# Patient Record
Sex: FEMALE | Race: WHITE | NOT HISPANIC OR LATINO | Employment: UNEMPLOYED | URBAN - METROPOLITAN AREA
[De-identification: names, ages, dates, MRNs, and addresses within clinical notes are randomized per-mention and may not be internally consistent; named-entity substitution may affect disease eponyms.]

---

## 2018-06-22 LAB
ABORH_EXT (HISTORICAL CONVERSION): NORMAL
ANTIBODY_EXT (HISTORICAL CONVERSION): NEGATIVE
HBSAG_EXT (HISTORICAL CONVERSION): NORMAL
HGB_EXT (HISTORICAL CONVERSION): 13.4
HIV_EXT: NEGATIVE
PLT_EXT - HISTORICAL: 191
RPR - HISTORICAL: NORMAL
RUBELLA_EXT (HISTORICAL CONVERSION): NORMAL

## 2019-01-15 ENCOUNTER — HOSPITAL ENCOUNTER (OUTPATIENT)
Dept: OBGYN | Facility: HOSPITAL | Age: 29
Discharge: HOME | End: 2019-01-15
Attending: OBSTETRICS & GYNECOLOGY | Admitting: OBSTETRICS & GYNECOLOGY

## 2019-01-15 DIAGNOSIS — R11.0 NAUSEA: ICD-10-CM

## 2019-01-15 LAB
ALBUMIN UR-MCNC: ABNORMAL MG/DL
APPEARANCE UR: ABNORMAL
BACTERIA #/AREA URNS HPF: ABNORMAL HPF
BILIRUB UR QL STRIP: NEGATIVE
COLOR UR AUTO: YELLOW
GLUCOSE UR STRIP-MCNC: NEGATIVE MG/DL
HGB UR QL STRIP: NEGATIVE
KETONES UR STRIP-MCNC: ABNORMAL MG/DL
LEUKOCYTE ESTERASE UR QL STRIP: ABNORMAL
MUCOUS THREADS #/AREA URNS LPF: ABNORMAL LPF
NITRATE UR QL: NEGATIVE
PH UR STRIP: 6 [PH] (ref 4.5–8)
RBC #/AREA URNS AUTO: ABNORMAL HPF
SP GR UR STRIP: 1.02 (ref 1–1.03)
SQUAMOUS #/AREA URNS AUTO: ABNORMAL LPF
TRANS CELLS #/AREA URNS HPF: ABNORMAL LPF
UROBILINOGEN UR STRIP-ACNC: ABNORMAL
WBC #/AREA URNS AUTO: ABNORMAL HPF

## 2019-01-15 RX ORDER — FLUOXETINE 10 MG/1
CAPSULE ORAL
Refills: 3 | Status: ON HOLD | COMMUNITY
Start: 2019-01-03 | End: 2024-09-03

## 2019-01-15 ASSESSMENT — MIFFLIN-ST. JEOR: SCORE: 1467.51

## 2019-01-16 LAB — BACTERIA SPEC CULT: NO GROWTH

## 2019-01-17 LAB
ALLERGIC TO PENICILLIN: NO
GP B STREP DNA SPEC QL NAA+PROBE: NEGATIVE

## 2019-02-01 ENCOUNTER — ANESTHESIA - HEALTHEAST (OUTPATIENT)
Dept: OBGYN | Facility: HOSPITAL | Age: 29
End: 2019-02-01

## 2019-02-04 ENCOUNTER — COMMUNICATION - HEALTHEAST (OUTPATIENT)
Dept: OBGYN | Facility: HOSPITAL | Age: 29
End: 2019-02-04

## 2021-05-25 ENCOUNTER — RECORDS - HEALTHEAST (OUTPATIENT)
Dept: ADMINISTRATIVE | Facility: CLINIC | Age: 31
End: 2021-05-25

## 2021-05-28 ENCOUNTER — RECORDS - HEALTHEAST (OUTPATIENT)
Dept: ADMINISTRATIVE | Facility: CLINIC | Age: 31
End: 2021-05-28

## 2021-05-30 ENCOUNTER — RECORDS - HEALTHEAST (OUTPATIENT)
Dept: ADMINISTRATIVE | Facility: CLINIC | Age: 31
End: 2021-05-30

## 2021-05-31 ENCOUNTER — RECORDS - HEALTHEAST (OUTPATIENT)
Dept: ADMINISTRATIVE | Facility: CLINIC | Age: 31
End: 2021-05-31

## 2021-06-01 ENCOUNTER — RECORDS - HEALTHEAST (OUTPATIENT)
Dept: ADMINISTRATIVE | Facility: CLINIC | Age: 31
End: 2021-06-01

## 2021-06-02 VITALS — BODY MASS INDEX: 35.53 KG/M2 | HEIGHT: 60 IN | WEIGHT: 181 LBS

## 2021-06-16 PROBLEM — Z34.90 PREGNANT: Status: ACTIVE | Noted: 2019-02-01

## 2021-06-23 NOTE — PROGRESS NOTES
md ordered a discharge after evaluation of the patient. Nurse discussed discharge orders with the patient and she signed for discharge. No further questions or concerns.

## 2021-06-23 NOTE — ANESTHESIA POSTPROCEDURE EVALUATION
Anesthesiology Labor Epidural Rounds    Patient doing well.  Denies headache.  Denies back pain.  Patient able to move all extremities.  Vital signs stable.  Site clean, dry and intact.    Patient satisfied with labor analgesia.  No complications.      Siria Lynch M.D.  Department of Anesthesiology

## 2021-06-23 NOTE — PROGRESS NOTES
Patient here via wheelchair from ER accompanied by her mother to room 31. Patient arrives with c/o nausea, vomiting and diarrhea that started last night around 10pm. Contractions also started around that time. Patient states that her coworkers have all been sick with the stomach flu. Patient changed into gown. EFM applied. Dr De La Rosa called, report given and orders obtained.
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
## 2021-06-23 NOTE — TELEPHONE ENCOUNTER
OB Follow Up Phone Call    Patient: Tangela Steward  : 1990  MRN: [id number]     Location  [facility]  Provider Jerilyn Carrington MD      :   N/A    Language:   English    Discharge Follow-Up:  Follow-Up call by Outreach nurse: Message left for patient    Type of Delivery:      Feeding Method:  Breastfeeding and Formula    Comments:   Left message with [facility] Outreach phone number for patient to call back if desired. Reminded patient to schedule postpartum follow-up appointment as directed by PCP at discharge.  Encouraged patient to call clinic/PCP with questions or concerns.    Completed by:   Isabell Javier RN      Patient: Tangela Steward  : 1990  MRN: [id number]

## 2021-06-23 NOTE — ANESTHESIA PROCEDURE NOTES
Epidural Block    Patient location during procedure: OB  Time Called: 2/1/2019 10:40 AM  Reason for Block:labor epidural  Staffing:  Performing  Anesthesiologist: Cristiana Leyva MD  Preanesthetic Checklist  Completed: patient identified, risks, benefits, and alternatives discussed, timeout performed, consent obtained, at patient's request, airway assessed, oxygen available, suction available, emergency drugs available and hand hygiene performed  Procedure  Patient position: sitting  Prep: ChloraPrep and site prepped and draped  Patient monitoring: continuous pulse oximetry, heart rate and blood pressure  Approach: midline  Location: L3-L4  Injection technique: HERMILA saline  Number of Attempts:3 (Difficult placement)  Needle  Needle type: Soren   Needle gauge: 18 G     Catheter in Space: 5  Assessment  Sensory level:  No complications

## 2023-04-05 ENCOUNTER — HOSPITAL ENCOUNTER (EMERGENCY)
Facility: CLINIC | Age: 33
Discharge: HOME | End: 2023-04-06
Attending: EMERGENCY MEDICINE | Admitting: EMERGENCY MEDICINE
Payer: MEDICAID

## 2023-04-05 ENCOUNTER — APPOINTMENT (OUTPATIENT)
Dept: CT IMAGING | Facility: CLINIC | Age: 33
End: 2023-04-05
Attending: EMERGENCY MEDICINE
Payer: MEDICAID

## 2023-04-05 DIAGNOSIS — N10 ACUTE PYELONEPHRITIS: ICD-10-CM

## 2023-04-05 LAB
ALBUMIN SERPL-MCNC: 3.8 G/DL (ref 3.5–5)
ALBUMIN UR-MCNC: 30 MG/DL
ALP SERPL-CCNC: 116 U/L (ref 45–120)
ALT SERPL W P-5'-P-CCNC: 49 U/L (ref 0–45)
ANION GAP SERPL CALCULATED.3IONS-SCNC: 10 MMOL/L (ref 5–18)
APPEARANCE UR: ABNORMAL
AST SERPL W P-5'-P-CCNC: 26 U/L (ref 0–40)
BACTERIA #/AREA URNS HPF: ABNORMAL /HPF
BASOPHILS # BLD AUTO: 0 10E3/UL (ref 0–0.2)
BASOPHILS NFR BLD AUTO: 1 %
BILIRUB SERPL-MCNC: 0.7 MG/DL (ref 0–1)
BILIRUB UR QL STRIP: NEGATIVE
BUN SERPL-MCNC: 9 MG/DL (ref 8–22)
CALCIUM SERPL-MCNC: 9.1 MG/DL (ref 8.5–10.5)
CHLORIDE BLD-SCNC: 105 MMOL/L (ref 98–107)
CO2 SERPL-SCNC: 25 MMOL/L (ref 22–31)
COLOR UR AUTO: YELLOW
CREAT SERPL-MCNC: 0.74 MG/DL (ref 0.6–1.1)
EOSINOPHIL # BLD AUTO: 0.6 10E3/UL (ref 0–0.7)
EOSINOPHIL NFR BLD AUTO: 8 %
ERYTHROCYTE [DISTWIDTH] IN BLOOD BY AUTOMATED COUNT: 13.9 % (ref 10–15)
FLUAV RNA SPEC QL NAA+PROBE: NEGATIVE
FLUBV RNA RESP QL NAA+PROBE: NEGATIVE
GFR SERPL CREATININE-BSD FRML MDRD: >90 ML/MIN/1.73M2
GLUCOSE BLD-MCNC: 94 MG/DL (ref 70–125)
GLUCOSE UR STRIP-MCNC: NEGATIVE MG/DL
HCG UR QL: NEGATIVE
HCT VFR BLD AUTO: 42.7 % (ref 35–47)
HGB BLD-MCNC: 14 G/DL (ref 11.7–15.7)
HGB UR QL STRIP: NEGATIVE
IMM GRANULOCYTES # BLD: 0 10E3/UL
IMM GRANULOCYTES NFR BLD: 0 %
KETONES UR STRIP-MCNC: NEGATIVE MG/DL
LEUKOCYTE ESTERASE UR QL STRIP: ABNORMAL
LIPASE SERPL-CCNC: 20 U/L (ref 0–52)
LYMPHOCYTES # BLD AUTO: 1.7 10E3/UL (ref 0.8–5.3)
LYMPHOCYTES NFR BLD AUTO: 22 %
MCH RBC QN AUTO: 30 PG (ref 26.5–33)
MCHC RBC AUTO-ENTMCNC: 32.8 G/DL (ref 31.5–36.5)
MCV RBC AUTO: 91 FL (ref 78–100)
MONOCYTES # BLD AUTO: 0.5 10E3/UL (ref 0–1.3)
MONOCYTES NFR BLD AUTO: 7 %
MUCOUS THREADS #/AREA URNS LPF: PRESENT /LPF
NEUTROPHILS # BLD AUTO: 4.9 10E3/UL (ref 1.6–8.3)
NEUTROPHILS NFR BLD AUTO: 62 %
NITRATE UR QL: NEGATIVE
NRBC # BLD AUTO: 0 10E3/UL
NRBC BLD AUTO-RTO: 0 /100
PH UR STRIP: 7.5 [PH] (ref 5–7)
PLATELET # BLD AUTO: 255 10E3/UL (ref 150–450)
POTASSIUM BLD-SCNC: 3.7 MMOL/L (ref 3.5–5)
PROT SERPL-MCNC: 7.7 G/DL (ref 6–8)
RBC # BLD AUTO: 4.67 10E6/UL (ref 3.8–5.2)
RBC URINE: 2 /HPF
RSV RNA SPEC NAA+PROBE: NEGATIVE
SARS-COV-2 RNA RESP QL NAA+PROBE: NEGATIVE
SODIUM SERPL-SCNC: 140 MMOL/L (ref 136–145)
SP GR UR STRIP: 1.03 (ref 1–1.03)
SQUAMOUS EPITHELIAL: 48 /HPF
UROBILINOGEN UR STRIP-MCNC: 3 MG/DL
WBC # BLD AUTO: 7.8 10E3/UL (ref 4–11)
WBC URINE: 16 /HPF

## 2023-04-05 PROCEDURE — 250N000013 HC RX MED GY IP 250 OP 250 PS 637: Performed by: EMERGENCY MEDICINE

## 2023-04-05 PROCEDURE — 258N000003 HC RX IP 258 OP 636: Performed by: EMERGENCY MEDICINE

## 2023-04-05 PROCEDURE — 87637 SARSCOV2&INF A&B&RSV AMP PRB: CPT | Performed by: EMERGENCY MEDICINE

## 2023-04-05 PROCEDURE — 81001 URINALYSIS AUTO W/SCOPE: CPT | Performed by: EMERGENCY MEDICINE

## 2023-04-05 PROCEDURE — 96375 TX/PRO/DX INJ NEW DRUG ADDON: CPT

## 2023-04-05 PROCEDURE — 80053 COMPREHEN METABOLIC PANEL: CPT | Performed by: EMERGENCY MEDICINE

## 2023-04-05 PROCEDURE — 85025 COMPLETE CBC W/AUTO DIFF WBC: CPT | Performed by: EMERGENCY MEDICINE

## 2023-04-05 PROCEDURE — 250N000011 HC RX IP 250 OP 636: Performed by: EMERGENCY MEDICINE

## 2023-04-05 PROCEDURE — 83690 ASSAY OF LIPASE: CPT | Performed by: EMERGENCY MEDICINE

## 2023-04-05 PROCEDURE — 87086 URINE CULTURE/COLONY COUNT: CPT | Performed by: EMERGENCY MEDICINE

## 2023-04-05 PROCEDURE — 81025 URINE PREGNANCY TEST: CPT | Performed by: EMERGENCY MEDICINE

## 2023-04-05 PROCEDURE — 99285 EMERGENCY DEPT VISIT HI MDM: CPT | Mod: 25,CS

## 2023-04-05 PROCEDURE — 74176 CT ABD & PELVIS W/O CONTRAST: CPT

## 2023-04-05 PROCEDURE — 36415 COLL VENOUS BLD VENIPUNCTURE: CPT | Performed by: EMERGENCY MEDICINE

## 2023-04-05 PROCEDURE — C9803 HOPD COVID-19 SPEC COLLECT: HCPCS

## 2023-04-05 PROCEDURE — 96361 HYDRATE IV INFUSION ADD-ON: CPT

## 2023-04-05 RX ORDER — ONDANSETRON 2 MG/ML
4 INJECTION INTRAMUSCULAR; INTRAVENOUS ONCE
Status: COMPLETED | OUTPATIENT
Start: 2023-04-05 | End: 2023-04-05

## 2023-04-05 RX ORDER — CEFTRIAXONE 1 G/1
1 INJECTION, POWDER, FOR SOLUTION INTRAMUSCULAR; INTRAVENOUS ONCE
Status: COMPLETED | OUTPATIENT
Start: 2023-04-06 | End: 2023-04-06

## 2023-04-05 RX ADMIN — HYOSCYAMINE SULFATE 125 MCG: 0.12 TABLET ORAL at 22:58

## 2023-04-05 RX ADMIN — ONDANSETRON 4 MG: 2 INJECTION INTRAMUSCULAR; INTRAVENOUS at 21:49

## 2023-04-05 RX ADMIN — SODIUM CHLORIDE 1000 ML: 9 INJECTION, SOLUTION INTRAVENOUS at 21:49

## 2023-04-05 ASSESSMENT — ENCOUNTER SYMPTOMS
FREQUENCY: 1
NAUSEA: 1
ABDOMINAL PAIN: 1
DIARRHEA: 0
DYSURIA: 0
FLANK PAIN: 1
VOMITING: 1
CHILLS: 1

## 2023-04-06 VITALS
SYSTOLIC BLOOD PRESSURE: 119 MMHG | TEMPERATURE: 98.6 F | HEART RATE: 75 BPM | BODY MASS INDEX: 42.05 KG/M2 | DIASTOLIC BLOOD PRESSURE: 59 MMHG | WEIGHT: 215.3 LBS | RESPIRATION RATE: 16 BRPM | OXYGEN SATURATION: 96 %

## 2023-04-06 PROCEDURE — 250N000011 HC RX IP 250 OP 636: Performed by: EMERGENCY MEDICINE

## 2023-04-06 PROCEDURE — 96365 THER/PROPH/DIAG IV INF INIT: CPT

## 2023-04-06 RX ORDER — ONDANSETRON 4 MG/1
4 TABLET, ORALLY DISINTEGRATING ORAL EVERY 8 HOURS PRN
Qty: 10 TABLET | Refills: 0 | Status: SHIPPED | OUTPATIENT
Start: 2023-04-06 | End: 2023-04-09

## 2023-04-06 RX ORDER — CIPROFLOXACIN 500 MG/1
500 TABLET, FILM COATED ORAL 2 TIMES DAILY
Qty: 14 TABLET | Refills: 0 | Status: SHIPPED | OUTPATIENT
Start: 2023-04-06 | End: 2023-04-13

## 2023-04-06 RX ADMIN — CEFTRIAXONE 1 G: 1 INJECTION, POWDER, FOR SOLUTION INTRAMUSCULAR; INTRAVENOUS at 00:09

## 2023-04-06 NOTE — ED NOTES
VSS upon discharge. AVS reviewed with patient and mother. Education provided. All questions answered.

## 2023-04-06 NOTE — ED PROVIDER NOTES
NAME: Tangela Steward  AGE: 32 year old female  YOB: 1990  MRN: [id number]  EVALUATION DATE & TIME: 2023 11:26 PM    PCP: Tiera Ortiz    ED PROVIDER: Freddie Saenz M.D.      Chief Complaint   Patient presents with     Abdominal Pain     Nausea, Vomiting, & Diarrhea     FINAL IMPRESSION:  1. Acute pyelonephritis      MEDICAL DECISION MAKIN:36 PM I met with the patient, obtained history, performed an initial exam, and discussed options and plan for diagnostics and treatment here in the ED.   12:25 AM I rechecked and updated patient on results. She is feeling improved and is comfortable going home at this time. We discussed the plan for discharge and the patient is agreeable. Reviewed supportive cares, symptomatic treatment, outpatient follow up, and reasons to return to the Emergency Department. Patient to be discharged by ED RN.   Patient was clinically assessed and consented to treatment. After assessment, medical decision making and workup were discussed with the patient. The patient was agreeable to plan for testing, workup, and treatment.  Pertinent Labs & Imaging studies reviewed. (See chart for details)       Medical Decision Making    History:    Supplemental history from: Documented in chart, if applicable    External Record(s) reviewed: Documented in chart, if applicable.    Work Up:    Chart documentation includes differential considered and any EKGs or imaging independently interpreted by provider, where specified.    In additional to work up documented, I considered the following work up: Documented in chart, if applicable.    External consultation:    Discussion of management with another provider: Documented in chart, if applicable    Complicating factors:    Care impacted by chronic illness: N/A    Care affected by social determinants of health: N/A    Disposition considerations: Discharge. I prescribed additional prescription strength medication(s) as  "charted. See documentation for any additional details.    Tangela Steward is a 32 year old female who presents with abdominal pain with nausea, vomiting, diarrhea.   Differential diagnosis includes but not limited to urinary tract infection, pyelonephritis, kidney stone, colitis, diverticulitis, gastroenteritis.  Patient with history of urinary tract infections and kidney stones.  Findings more consistent with possible kidney stone or urinary tract infection given labs done from triage which showed pyuria in the urine.  Patient's labs otherwise unremarkable for acute kidney injury and CBC stable.  Patient sent for CT scan without contrast that did not show any kidney stones.  She does have a history of possible gallstones however no tenderness in the right upper quadrant and feels more consistent with pyelonephritis on the left.  Patient feeling better after medications given in triage including Zofran and fluids.  Additionally she will be given a dose of ceftriaxone and plan for discharge home on Cipro for 1 week along with Zofran as needed.  Patient feeling much better and we discussed recommendations.  She also does have this history of \" dumping\" after eating but this has been ongoing and does not sound like diarrhea on description as its mainly after eating frequent and profuse.  I did recommend follow-up with the [facility] to get work-up for irritable bowel at this is likely the cause as this been going on for some time and not likely related to current episode.    0 minutes of critical care time    MEDICATIONS GIVEN IN THE EMERGENCY:  Medications   ondansetron (ZOFRAN) injection 4 mg (4 mg Intravenous $Given 4/5/23 2149)   0.9% sodium chloride BOLUS (0 mLs Intravenous Stopped 4/5/23 2224)   hyoscyamine (LEVSIN/SL) sublingual tablet 125 mcg (125 mcg Sublingual $Given 4/5/23 8288)   cefTRIAXone (ROCEPHIN) 1 g vial to attach to  mL bag for ADULTS or NS 50 mL bag for PEDS (0 g Intravenous Stopped " "4/6/23 0029)       NEW PRESCRIPTIONS STARTED AT TODAY'S ER VISIT:  Discharge Medication List as of 4/6/2023 12:30 AM      START taking these medications    Details   ciprofloxacin (CIPRO) 500 MG tablet Take 1 tablet (500 mg) by mouth 2 times daily for 7 days, Disp-14 tablet, R-0, Local Print      ondansetron (ZOFRAN ODT) 4 MG ODT tab Take 1 tablet (4 mg) by mouth every 8 hours as needed for nausea, Disp-10 tablet, R-0, Local Print                =================================================================    HPI    Patient information was obtained from: Patient    Use of : N/A        Tangela Steward is a 32 year old female with a past medical history of kidney stones and kidney infections, who presents to the ED via walk-in for the evaluation of abdominal pain and flank pain.    Patient reports a history of kidney stones and infections. She states that she was told once by her mom that she might have had gallstones once in the past, but is ultimately unsure. She does not believes she has had ultrasounds in the past but does mentions she has had CT scans. About a week ago, she reports she developed some left upper quadrant abdominal pain and left flank pain. She initially ignored her symptoms as she thought it was a stone. She also notes some darker urine the last 4-5 days and now reports nausea and vomiting over the last two days. She states that she has been unable to keep much down. Patient called her mom, who brought her into the ED tonight. Patient reports that over the last two weeks, she has felt chilled but is unsure if it is attributed to current symptoms. She also notes some urinary frequency and frequent loose stools especially the last two days, not perfuse diarrhea, but states that the minute she eats, it feels as if it \"goes right through her.\" Otherwise, she denies any dysuria. Patient does not think she is pregnant. No other complaints at this time.    REVIEW OF SYSTEMS   Review of " "Systems   Constitutional: Positive for chills.   Gastrointestinal: Positive for abdominal pain (LUQ), nausea and vomiting. Negative for diarrhea.        Positive for \"loose stools\"   Genitourinary: Positive for flank pain (left) and frequency. Negative for dysuria.        Positive for \"darker\" urine   All other systems reviewed and are negative.     PAST MEDICAL HISTORY:  History reviewed. No pertinent past medical history.    PAST SURGICAL HISTORY:  History reviewed. No pertinent surgical history.    CURRENT MEDICATIONS:    No current facility-administered medications for this encounter.    Current Outpatient Medications:      ciprofloxacin (CIPRO) 500 MG tablet, Take 1 tablet (500 mg) by mouth 2 times daily for 7 days, Disp: 14 tablet, Rfl: 0     ondansetron (ZOFRAN ODT) 4 MG ODT tab, Take 1 tablet (4 mg) by mouth every 8 hours as needed for nausea, Disp: 10 tablet, Rfl: 0     FLUoxetine (PROZAC) 10 MG capsule, [FLUOXETINE (PROZAC) 10 MG CAPSULE] , Disp: , Rfl: 3     ibuprofen (ADVIL,MOTRIN) 600 MG tablet, [IBUPROFEN (ADVIL,MOTRIN) 600 MG TABLET] Take 1 tablet (600 mg total) by mouth every 6 (six) hours as needed for pain., Disp: 20 tablet, Rfl: 2    ALLERGIES:  Allergies   Allergen Reactions     Bupropion Other (See Comments)     suicidality  suicidality  suicidality       Hydrocodone-Acetaminophen Nausea and Vomiting     Buspirone Nausea     Nausea, but significant       FAMILY HISTORY:  History reviewed. No pertinent family history.    SOCIAL HISTORY:   Social History     Socioeconomic History     Marital status:    Tobacco Use     Smoking status: Former     Smokeless tobacco: Never   Substance and Sexual Activity     Alcohol use: No     Drug use: No     Sexual activity: Not Currently     Partners: Male       PHYSICAL EXAM:    Vitals: /59   Pulse 75   Temp 98.6  F (37  C) (Temporal)   Resp 16   Wt 97.7 kg (215 lb 4.8 oz)   LMP 01/29/2023   SpO2 96%   BMI 42.05 kg/m     Physical Exam  Vitals " and nursing note reviewed.   Constitutional:       General: She is not in acute distress.     Appearance: She is well-developed and normal weight. She is not ill-appearing or toxic-appearing.   HENT:      Head: Normocephalic.   Cardiovascular:      Rate and Rhythm: Normal rate and regular rhythm.      Heart sounds: Normal heart sounds.   Pulmonary:      Effort: Pulmonary effort is normal. No respiratory distress.      Breath sounds: Normal breath sounds.   Abdominal:      General: Abdomen is flat. Bowel sounds are normal.      Palpations: Abdomen is soft.      Tenderness: There is abdominal tenderness in the left upper quadrant. There is left CVA tenderness. There is no right CVA tenderness, guarding or rebound.      Hernia: No hernia is present.   Skin:     General: Skin is warm and dry.      Coloration: Skin is not jaundiced.   Neurological:      Mental Status: She is alert.   Psychiatric:         Behavior: Behavior normal.           LAB:  All pertinent labs reviewed and interpreted.  Labs Ordered and Resulted from Time of ED Arrival to Time of ED Departure   ROUTINE UA WITH MICROSCOPIC REFLEX TO CULTURE - Abnormal       Result Value    Color Urine Yellow      Appearance Urine Turbid (*)     Glucose Urine Negative      Bilirubin Urine Negative      Ketones Urine Negative      Specific Gravity Urine 1.032 (*)     Blood Urine Negative      pH Urine 7.5 (*)     Protein Albumin Urine 30 (*)     Urobilinogen Urine 3.0 (*)     Nitrite Urine Negative      Leukocyte Esterase Urine 250 Nathalia/uL (*)     Bacteria Urine Few (*)     Mucus Urine Present (*)     RBC Urine 2      WBC Urine 16 (*)     Squamous Epithelials Urine 48 (*)    COMPREHENSIVE METABOLIC PANEL - Abnormal    Sodium 140      Potassium 3.7      Chloride 105      Carbon Dioxide (CO2) 25      Anion Gap 10      Urea Nitrogen 9      Creatinine 0.74      Calcium 9.1      Glucose 94      Alkaline Phosphatase 116      AST 26      ALT 49 (*)     Protein Total 7.7       Albumin 3.8      Bilirubin Total 0.7      GFR Estimate >90     INFLUENZA A/B, RSV, & SARS-COV2 PCR - Normal    Influenza A PCR Negative      Influenza B PCR Negative      RSV PCR Negative      SARS CoV2 PCR Negative     LIPASE - Normal    Lipase 20     HCG QUALITATIVE URINE - Normal    hCG Urine Qualitative Negative     CBC WITH PLATELETS AND DIFFERENTIAL    WBC Count 7.8      RBC Count 4.67      Hemoglobin 14.0      Hematocrit 42.7      MCV 91      MCH 30.0      MCHC 32.8      RDW 13.9      Platelet Count 255      % Neutrophils 62      % Lymphocytes 22      % Monocytes 7      % Eosinophils 8      % Basophils 1      % Immature Granulocytes 0      NRBCs per 100 WBC 0      Absolute Neutrophils 4.9      Absolute Lymphocytes 1.7      Absolute Monocytes 0.5      Absolute Eosinophils 0.6      Absolute Basophils 0.0      Absolute Immature Granulocytes 0.0      Absolute NRBCs 0.0     URINE CULTURE       RADIOLOGY:  CT Abdomen Pelvis w/o Contrast   Final Result   IMPRESSION:    1.  No acute pathology in the abdomen or pelvis. No urinary tract calculus or hydronephrosis.           PROCEDURES:   Procedures       I, Dorcas Alvarado, am serving as a scribe to document services personally performed by Dr. Freddie Saenz  based on my observation and the provider's statements to me. I, Freddie Saenz MD attest that Dorcas Alvarado is acting in a scribe capacity, has observed my performance of the services and has documented them in accordance with my direction.      Freddie Saenz M.D.  Emergency Medicine  [facility] Emergency Department     Freddie Saenz MD  04/06/23 1123

## 2023-04-06 NOTE — ED TRIAGE NOTES
Here for abdominal pain that radiates to LUQ, started about three days ago. Also reporting N/V/D and dark colored urine that started a few weeks ago.   Zofran last at noon   Patient unsure if pregnant   Reports history of kidney stones      Triage Assessment       Row Name 04/05/23 2130       Triage Assessment (Adult)    Airway WDL WDL       Respiratory WDL    Respiratory WDL WDL       Skin Circulation/Temperature WDL    Skin Circulation/Temperature WDL WDL       Cardiac WDL    Cardiac WDL WDL       Peripheral/Neurovascular WDL    Peripheral Neurovascular WDL WDL       Cognitive/Neuro/Behavioral WDL    Cognitive/Neuro/Behavioral WDL WDL

## 2023-04-07 LAB — BACTERIA UR CULT: NORMAL

## 2023-12-21 ENCOUNTER — HOSPITAL ENCOUNTER (EMERGENCY)
Facility: HOSPITAL | Age: 33
Discharge: HOME | End: 2023-12-21
Admitting: STUDENT IN AN ORGANIZED HEALTH CARE EDUCATION/TRAINING PROGRAM
Payer: MEDICAID

## 2023-12-21 VITALS
RESPIRATION RATE: 19 BRPM | WEIGHT: 213 LBS | BODY MASS INDEX: 41.82 KG/M2 | TEMPERATURE: 97 F | OXYGEN SATURATION: 98 % | HEART RATE: 78 BPM | SYSTOLIC BLOOD PRESSURE: 117 MMHG | HEIGHT: 60 IN | DIASTOLIC BLOOD PRESSURE: 70 MMHG

## 2023-12-21 DIAGNOSIS — U07.1 COVID-19: ICD-10-CM

## 2023-12-21 PROBLEM — F41.9 ANXIETY DISORDER, UNSPECIFIED: Status: ACTIVE | Noted: 2017-07-07

## 2023-12-21 PROBLEM — Z87.42 HISTORY OF ABNORMAL CERVICAL PAP SMEAR: Status: ACTIVE | Noted: 2023-12-21

## 2023-12-21 PROBLEM — B97.7 HPV IN FEMALE: Status: ACTIVE | Noted: 2017-05-09

## 2023-12-21 PROBLEM — F33.41 RECURRENT MAJOR DEPRESSION IN PARTIAL REMISSION (H): Status: ACTIVE | Noted: 2017-07-07

## 2023-12-21 PROBLEM — E66.813 CLASS 3 OBESITY: Status: ACTIVE | Noted: 2023-11-20

## 2023-12-21 LAB
FLUAV RNA SPEC QL NAA+PROBE: NEGATIVE
FLUBV RNA RESP QL NAA+PROBE: NEGATIVE
RSV RNA SPEC NAA+PROBE: NEGATIVE
SARS-COV-2 RNA RESP QL NAA+PROBE: POSITIVE

## 2023-12-21 PROCEDURE — 99283 EMERGENCY DEPT VISIT LOW MDM: CPT

## 2023-12-21 PROCEDURE — 87637 SARSCOV2&INF A&B&RSV AMP PRB: CPT | Performed by: STUDENT IN AN ORGANIZED HEALTH CARE EDUCATION/TRAINING PROGRAM

## 2023-12-21 NOTE — Clinical Note
Tangela Steward was seen and treated in our emergency department on 12/21/2023.  She may return to work on 12/22/2023.       If you have any questions or concerns, please don't hesitate to call.      Lanette Villarreal PA-C

## 2023-12-21 NOTE — DISCHARGE INSTRUCTIONS
You were seen in the emergency department today for your symptoms.  You did test positive for COVID-19 infection.  I work note was provided.  Follow-up with your primary care clinician as soon as you are able to see them for close follow-up.  Return to emergency department if you develop any new or worsening symptoms including worsening fevers, progressive pain, difficulty breathing, chest pain, rashes.

## 2023-12-21 NOTE — ED TRIAGE NOTES
Patient reports that she had Positive COVID test at home this morning.  Pt is required to have Dr note for work.  Pt states that she has had cough, sore throat, nausea, headache, fever.

## 2023-12-21 NOTE — ED PROVIDER NOTES
EMERGENCY DEPARTMENT ENCOUNTER      NAME: Tangela Steward  AGE: 33 year old female  YOB: 1990  MRN: [id number]  EVALUATION DATE & TIME: 12/21/23 9:15 AM    PCP: Tiera Ortiz    ED PROVIDER: Lanette Villarreal PA-C      CHIEF COMPLAINT:  COVID      FINAL IMPRESSION:  1. COVID-19          ED COURSE & MEDICAL DECISION MAKING:  Pertinent Labs & Imaging studies reviewed. (See chart for details)    MDM: The patient is a 33 year old female with history of class III obesity on semaglutide who presents to the Emergency Department for evaluation of fatigue, sore throat, nasal congestion, myalgias, cough, rhinorrhea. She tested positive for COVID-19 at home earlier today and presents to the emergency department for a work note.    Initial vitals reviewed and within normal limits. On exam, the patient is resting comfortably in the exam chair in no acute distress. The patient is clinically well appearing. Conjunctiva are clear, no drainage. Posterior oropharynx is clear with no erythema, edema, or exudate. No lesions to oral mucosa. No tonsillar hypertrophy. No muffled voice, no drooling, patient is tolerating her secretions. She is breathing comfortably on room air, no wheezing. Breath sounds clear to auscultation throughout. She has a blanchable erythematous macular rash to right lower back, not in dermatomal distribution. No drainage, increased warmth, streaking erythema from area.     Differential diagnosis includes viral illness including COVID-19, influenza, RSV. Low clinical suspicion for mononucleosis, tonsillitis, allergy, GERD. No visible peritonsillar abscess on exam, uvula is midline. No lip or tongue swelling to suggest anaphylaxis. Given history and exam, low suspicion for Hemanth's angina, epiglottitis, mass/tumor. No nuchal rigitidy, trismus, fever currently to suggest retropharyngeal/parapharyngeal abscess or other deep space infection. No muffled voice, stridor, drooling, or  respiratory distress to suggest airway compromise. Low clinical suspicion for SJS, TEN, DRESS syndrome, shingles. Mild rash most likely viral in nature, perhaps could be due to new medication of semaglutide.     Work up included laboratory studies. The patient tested positive for COVID-19. Symptoms and work up most consistent with COVID-19.     Plan for discharge to home in stable condition with work note and close outpatient follow up with primary care clinician and to call obesity specialist who manages semaglutide prescription to see if she should hold the medication given current symptoms. The patient verbalized understanding and is in agreement with this plan. Emphasized importance of close follow up with their primary care clinician. Strict return precautions discussed.        Medical Decision Making    History:  Supplemental history from: Documented in chart, if applicable  External Record(s) reviewed: Documented in chart, if applicable.    Work Up:  Chart documentation includes differential considered and any EKGs or imaging independently interpreted by provider, where specified.  In additional to work up documented, I considered the following work up: Documented in chart, if applicable.    External consultation:  Discussion of management with another provider: Documented in chart, if applicable    Complicating factors:  Care impacted by chronic illness: N/A  Care affected by social determinants of health: N/A    Disposition considerations: Discharge. No recommendations on prescription strength medication(s). N/A.        ED COURSE:       10:30 AM I met and introduced myself to the patient. I gathered initial history and performed an initial physical exam. We discussed options and plan for diagnostics and treatment here in the ED.  10:38 AM I discussed the plan for discharge with the patient, and patient is agreeable. We discussed supportive cares at home and reasons for return to the ER including new or  "worsening symptoms - all questions and concerns addressed to the best of my ability. Strict return precautions discussed. Patient to be discharged by RN.    At the conclusion of the encounter I discussed the results of all the tests and the disposition. The questions were answered to the best of my ability. The patient and/or family acknowledged understanding and was agreeable with the care plan.          MEDICATIONS GIVEN IN THE EMERGENCY:  Medications - No data to display    NEW PRESCRIPTIONS STARTED AT TODAY'S ER VISIT  Discharge Medication List as of 12/21/2023 11:11 AM             =================================================================    HPI    Patient information was obtained from: Patient.    Use of Intrepreter: N/A       Tangela Steward is a 33 year old female with pertinent medical history of intermittent asthma who presents to the emergency department for evaluation of COVID symptoms.    The patient reports that starting Friday (12/15/2023), she experienced the onset of fatigue, sore throat, decreased appetite, and nasal congestion. She developed a fever of 102 F on Monday (12/18/2023), chills, and body aches. Patient mentions that her symptoms got \"a lot worse\" yesterday, and additionally endorses a cough, diarrhea, rhinorrhea, and a rash that appeared on her back yesterday. She says she has never experienced a similar rash before and notes that she has not used any new soaps, detergents, or lotions, and has had no exposures to new pets or foods. She is not on any form of birth control, not taking any antibiotics, and is not on hormone therapy.     The patient notes that she sees a weight-loss specialist and that she started semaglutide on Friday (12/15/2023), the same day of onset for her symptoms. She initially associated her symptoms with this medication change, but decided to take a home COVID-19 test last night. She presents to the ED to receive a work dismissal note stating that she is " unable to go into work tonight.     Denies chest pain, shortness of breath outside of coughing, dysphagia, hemoptysis, leg swelling, recent travel, or any other concerns at this time.       PAST MEDICAL HISTORY:  History reviewed. No pertinent past medical history.    PAST SURGICAL HISTORY:  History reviewed. No pertinent surgical history.    CURRENT MEDICATIONS:    Prior to Admission Medications   Prescriptions Last Dose Informant Patient Reported? Taking?   FLUoxetine (PROZAC) 10 MG capsule   Yes No   Sig: [FLUOXETINE (PROZAC) 10 MG CAPSULE]    ibuprofen (ADVIL,MOTRIN) 600 MG tablet   No No   Sig: [IBUPROFEN (ADVIL,MOTRIN) 600 MG TABLET] Take 1 tablet (600 mg total) by mouth every 6 (six) hours as needed for pain.      Facility-Administered Medications: None       ALLERGIES:  Allergies   Allergen Reactions    Bupropion Other (See Comments)     suicidality  suicidality  suicidality      Hydrocodone-Acetaminophen Nausea and Vomiting    Buspirone Nausea     Nausea, but significant       FAMILY HISTORY:  History reviewed. No pertinent family history.    SOCIAL HISTORY:  Social History     Tobacco Use    Smoking status: Former    Smokeless tobacco: Never   Substance Use Topics    Alcohol use: No    Drug use: No        VITALS:    First Vitals:  Patient Vitals for the past 24 hrs:   BP Temp Temp src Pulse Resp SpO2 Height Weight   12/21/23 1114 117/70 -- -- 78 19 98 % -- --   12/21/23 0906 126/60 97  F (36.1  C) Temporal 86 18 98 % 1.524 m (5') 96.6 kg (213 lb)       Patient Vitals for the past 24 hrs:   BP Temp Temp src Pulse Resp SpO2 Height Weight   12/21/23 1114 117/70 -- -- 78 19 98 % -- --   12/21/23 0906 126/60 97  F (36.1  C) Temporal 86 18 98 % 1.524 m (5') 96.6 kg (213 lb)       PHYSICAL EXAM  VITAL SIGNS: /70   Pulse 78   Temp 97  F (36.1  C) (Temporal)   Resp 19   Ht 1.524 m (5')   Wt 96.6 kg (213 lb)   LMP 12/16/2023   SpO2 98%   BMI 41.60 kg/m     GENERAL: Awake, alert, answering questions  appropriately, resting comfortably in the exam chair in no acute distress.  HEENT: Conjunctivae clear, no drainage. Posterior oropharynx clear with no erythema, no exudate. No tonsillar hypertrophy. Uvula midline. Tolerating secretions, no drooling.   SPEECH:  Easy to understand speech, Normal volume and chago. Normal phonation.  PULMONARY: No respiratory distress, Breathing comfortably on room air. Lungs clear to auscultation bilaterally.  CARDIOVASCULAR: Regular rate and rhythm, radial pulses present, symmetric, and normal.  ABDOMINAL: Soft, Nondistended, Nontender, No rebound or guarding, No palpable masses.  EXTREMITIES: Extremities are warm and well perfused. No lower extremity edema.  NEUROLOGIC: Moving all extremities spontaneously.   SKIN: Exposed areas of skin warm, dry, with blanchable erythematous macular rash to right lower back, not in dermatomal distribution. No drainage, increased warmth, streaking erythema from area.   PSYCH: Normal mood and affect.           RADIOLOGY/LAB:  Reviewed all pertinent imaging. Please see official radiology report.  All pertinent labs reviewed and interpreted.  Results for orders placed or performed during the hospital encounter of 12/21/23   Symptomatic Influenza A/B, RSV, & SARS-CoV2 PCR (COVID-19) Nasopharyngeal    Specimen: Nasopharyngeal; Swab   Result Value Ref Range    Influenza A PCR Negative Negative    Influenza B PCR Negative Negative    RSV PCR Negative Negative    SARS CoV2 PCR Positive (A) Negative               ISilvia, am serving as a scribe to document services personally performed by Lanette Villarreal PA-C, based on my observation and the provider's statements to me. I, Lanette Villarreal PA-C attest that Silvia Storm is acting in a scribe capacity, has observed my performance of the services and has documented them in accordance with my direction.         Lanette Villarreal PA-C  Emergency Medicine  [facility]  [facility] EMERGENCY DEPARTMENT  [address] [place] [id number]  [phone]  Dept: [phone]       Lanette Villarreal PA-C  12/21/23 4761

## 2024-01-08 ENCOUNTER — HOSPITAL ENCOUNTER (EMERGENCY)
Facility: HOSPITAL | Age: 34
Discharge: HOME | End: 2024-01-08
Attending: EMERGENCY MEDICINE | Admitting: EMERGENCY MEDICINE
Payer: MEDICAID

## 2024-01-08 VITALS
WEIGHT: 215 LBS | TEMPERATURE: 97.8 F | SYSTOLIC BLOOD PRESSURE: 132 MMHG | HEART RATE: 80 BPM | DIASTOLIC BLOOD PRESSURE: 64 MMHG | BODY MASS INDEX: 42.21 KG/M2 | OXYGEN SATURATION: 97 % | RESPIRATION RATE: 18 BRPM | HEIGHT: 60 IN

## 2024-01-08 DIAGNOSIS — H53.149 PHOTOPHOBIA: ICD-10-CM

## 2024-01-08 DIAGNOSIS — R11.0 NAUSEA: ICD-10-CM

## 2024-01-08 DIAGNOSIS — G43.909 MIGRAINE WITHOUT STATUS MIGRAINOSUS, NOT INTRACTABLE, UNSPECIFIED MIGRAINE TYPE: ICD-10-CM

## 2024-01-08 PROCEDURE — 258N000003 HC RX IP 258 OP 636: Performed by: EMERGENCY MEDICINE

## 2024-01-08 PROCEDURE — 99284 EMERGENCY DEPT VISIT MOD MDM: CPT | Mod: 25

## 2024-01-08 PROCEDURE — 96375 TX/PRO/DX INJ NEW DRUG ADDON: CPT

## 2024-01-08 PROCEDURE — 96374 THER/PROPH/DIAG INJ IV PUSH: CPT | Mod: 59

## 2024-01-08 PROCEDURE — 250N000011 HC RX IP 250 OP 636: Performed by: EMERGENCY MEDICINE

## 2024-01-08 RX ORDER — KETOROLAC TROMETHAMINE 15 MG/ML
15 INJECTION, SOLUTION INTRAMUSCULAR; INTRAVENOUS ONCE
Status: COMPLETED | OUTPATIENT
Start: 2024-01-08 | End: 2024-01-08

## 2024-01-08 RX ORDER — DEXAMETHASONE SODIUM PHOSPHATE 4 MG/ML
6 INJECTION, SOLUTION INTRA-ARTICULAR; INTRALESIONAL; INTRAMUSCULAR; INTRAVENOUS; SOFT TISSUE ONCE
Status: COMPLETED | OUTPATIENT
Start: 2024-01-08 | End: 2024-01-08

## 2024-01-08 RX ADMIN — SODIUM CHLORIDE 1000 ML: 9 INJECTION, SOLUTION INTRAVENOUS at 04:14

## 2024-01-08 RX ADMIN — DEXAMETHASONE SODIUM PHOSPHATE 6 MG: 4 INJECTION, SOLUTION INTRA-ARTICULAR; INTRALESIONAL; INTRAMUSCULAR; INTRAVENOUS; SOFT TISSUE at 04:16

## 2024-01-08 RX ADMIN — PROCHLORPERAZINE EDISYLATE 10 MG: 5 INJECTION INTRAMUSCULAR; INTRAVENOUS at 04:14

## 2024-01-08 RX ADMIN — KETOROLAC TROMETHAMINE 15 MG: 15 INJECTION, SOLUTION INTRAMUSCULAR; INTRAVENOUS at 04:14

## 2024-01-08 ASSESSMENT — ACTIVITIES OF DAILY LIVING (ADL): ADLS_ACUITY_SCORE: 35

## 2024-01-08 NOTE — ED TRIAGE NOTES
Pt has been having headache and n/v for 2 days and worsen today.      Triage Assessment (Adult)       Row Name 01/08/24 0333          Triage Assessment    Airway WDL WDL        Respiratory WDL    Respiratory WDL WDL        Cardiac WDL    Cardiac WDL WDL

## 2024-01-08 NOTE — ED PROVIDER NOTES
EMERGENCY DEPARTMENT ENCOUNTER      NAME: Tangela Steward  YOB: 1990  MRN: [id number]    FINAL IMPRESSION  1. Migraine without status migrainosus, not intractable, unspecified migraine type    2. Nausea    3. Photophobia        MEDICAL DECISION MAKING   Pertinent Labs & Imaging studies reviewed. (See chart for details)    Tangela Steward is a 33 year old female who presents for evaluation of a headache.  Patient reports a remote history of migraines but states that she has not had any difficulty with them for about 10 years.  Today, she presents with symptoms that began 3 days ago and have been progressively worsening since onset.  She reports associated photophobia, phonophobia, nausea, and vomiting.  No fever, fall or trauma, vision change/loss, focal neurodeficits, or other new complaints. Remainder of history and exam, as below.     I considered a broad differential diagnosis including, but not limited to: migraine, tension headache, cluster headache, sinusitis, temporal arteritis. No falls to suggest traumatic epidural/subdural hematoma. Patient has a non-focal neuro exam so have low suspicion for intracranial process such as CVA, SAH, SDH. There are no fever or infections symptoms to suggest meningitis or encephalitis. The patient also denies vision change or ocular pain and has no exam findings to suggest acute angle-closure glaucoma. Given history, reassuring exam, and in the absence of s/s suggestive of concerning intracranial pathology, I do feel that etiology is most likely primary/benign headache. I see no indications for lab/imaging workup on emergent basis and patient agrees.  Will plan to focus on management of symptoms with IV fluids, toradol, compazine, benadryl and decadron.     On re-evaluation, patient reported feeling much improved and had complete resolution of symptoms.  She has been able to tolerate p.o. without difficulty and was eager to go home and rest.  I did offer a  referral to neurology and/or prescription for Zofran but patient declined and is hoping that this was a one-off headache and she will not have any recurrence.  Will have her follow-up with primary care provider as needed..     Strict return precautions and follow up recommendations were discussed and all questions were answered. Patient endorsed understanding and was in agreement with plan.        Medical Decision Making  Obtained supplemental history:Supplemental history obtained?: No  Reviewed external records: External records reviewed?: Documented in chart  Care impacted by chronic illness:N/A  Care significantly affected by social determinants of health:Access to Medical Care  Did you consider but not order tests?: Work up considered but not performed and documented in chart, if applicable  Did you interpret images independently?: Independent interpretation of ECG and images noted in documentation, when applicable.  Consultation discussion with other provider:Did you involve another provider (consultant, , pharmacy, etc.)?: No  Discharge. I discussed a prescription for zofran, imitrex, but deferred after shared decision making discussion.. See documentation for any additional details.      ED COURSE  3:53 AM Introduced myself to the patient, obtained history of present illness, and performed initial physical exam at this time.   5:48 AM I rechecked the patient. Her symptoms have resolved.       MEDICATIONS GIVEN IN THE ED  Medications   dexAMETHasone (DECADRON) injection 6 mg (6 mg Intravenous $Given 1/8/24 5195)   prochlorperazine (COMPAZINE) injection 10 mg (10 mg Intravenous $Given 1/8/24 7824)   ketorolac (TORADOL) injection 15 mg (15 mg Intravenous $Given 1/8/24 6504)   sodium chloride 0.9% BOLUS 1,000 mL (0 mLs Intravenous Stopped 1/8/24 1544)       NEW PRESCRIPTIONS STARTED AT TODAY'S VISIT  Discharge Medication List as of 1/8/2024  5:47 AM              =================================================================    Chief Complaint   Patient presents with    Headache    Nausea & Vomiting         HPI:    Patient information was obtained from: the patient     Use of : N/A     Tangela Steward is a 33 year old female who presents for evaluation of headache, nausea, and vomiting.     The patient has had a progressively worsening migraine since 1/5/24 (Friday). Today, the patient began to vomit secondary to the pain. She notes that this migraine is located in the front of her head and radiates down into her nasal area. This is different from her normal migraines that are located at the back of her head. She endorses associated chills, nausea, photophobia, and mild phonophobia. At home, she has tried tylenol, ibuprofen, and aspirin without sufficient pain relief. No recent falls or head injuries. She reports that she used to take migraine medications several years ago, but has not had to in a while as she has not had a recurrence in years. The patient denies fever, abdominal pain, and any other symptoms at this time.     Patient allergic to Vicodin.       RELEVANT HISTORY, MEDICATIONS, & ALLERGIES   Past medical history, surgical history, family history, medications, and allergies reviewed and pertinent noted in HPI.    REVIEW OF SYSTEMS:  A complete review of systems was performed with pertinent positives and negatives noted in the HPI. All other systems negative.     PHYSICAL EXAM:    Vitals: /64   Pulse 80   Temp 97.8  F (36.6  C) (Oral)   Resp 18   Ht 1.524 m (5')   Wt 97.5 kg (215 lb)   LMP 12/16/2023   SpO2 97%   BMI 41.99 kg/m     General: Alert and interactive, comfortable appearing.  HENT: Atraumatic. Full AROM of neck. No nuchal rigidity. Conjunctiva clear.   Cardiovascular: Regular rate and rhythm.   Chest/Pulmonary: Normal work of breathing. Speaking in complete sentences. Lungs CTAB. No chest wall tenderness or  deformities.  Abdomen: Soft, nondistended. Nontender without guarding or rebound.  Extremities: Normal AROM of all major joints.  Skin: Warm and dry. Normal skin color.   Neuro: Speech clear. CNs grossly intact. Moves all extremities spontaneously.   Psych: Normal affect/mood, cooperative, memory appropriate.        I, [place], am serving as a scribe to document services personally performed by Dr. Gladis Mcgraw based on my observation and the provider's statements to me. I, Gladis Mcgraw MD attest that [employer] [place] is acting in a scribe capacity, has observed my performance of the services and has documented them in accordance with my direction.    Gladis Mcgraw M.D.  Emergency Medicine  [facility] EMERGENCY DEPARTMENT  [facility] [facility] [address]  [phone]  Dept: [phone]       Gladis Mcgraw MD  01/08/24 0506

## 2024-01-08 NOTE — DISCHARGE INSTRUCTIONS
You were seen in the Emergency Department today for a headache.       Please return to the ER if you experience uncontrolled pain, inability to keep fluids down, fever, and/or for any other new or concerning symptoms, otherwise please follow up with your primary doctor as needed for recheck.     Below is some information you might find useful.     Thank you for choosing [facility]. It was a pleasure taking care of you today!  - Dr. Gladis Mcgraw

## 2024-02-06 ENCOUNTER — APPOINTMENT (OUTPATIENT)
Dept: ULTRASOUND IMAGING | Facility: HOSPITAL | Age: 34
End: 2024-02-06
Attending: FAMILY MEDICINE
Payer: MEDICAID

## 2024-02-06 ENCOUNTER — HOSPITAL ENCOUNTER (EMERGENCY)
Facility: HOSPITAL | Age: 34
Discharge: HOME | End: 2024-02-06
Admitting: PHYSICIAN ASSISTANT
Payer: MEDICAID

## 2024-02-06 VITALS
BODY MASS INDEX: 38.09 KG/M2 | OXYGEN SATURATION: 100 % | RESPIRATION RATE: 20 BRPM | HEIGHT: 63 IN | HEART RATE: 78 BPM | WEIGHT: 215 LBS | SYSTOLIC BLOOD PRESSURE: 135 MMHG | DIASTOLIC BLOOD PRESSURE: 56 MMHG | TEMPERATURE: 98.4 F

## 2024-02-06 DIAGNOSIS — O20.9 VAGINAL BLEEDING IN PREGNANCY, FIRST TRIMESTER: ICD-10-CM

## 2024-02-06 LAB
ABO/RH(D): NORMAL
ERYTHROCYTE [DISTWIDTH] IN BLOOD BY AUTOMATED COUNT: 14.1 % (ref 10–15)
HCG INTACT+B SERPL-ACNC: ABNORMAL MIU/ML
HCT VFR BLD AUTO: 42.7 % (ref 35–47)
HGB BLD-MCNC: 14 G/DL (ref 11.7–15.7)
MCH RBC QN AUTO: 29.6 PG (ref 26.5–33)
MCHC RBC AUTO-ENTMCNC: 32.8 G/DL (ref 31.5–36.5)
MCV RBC AUTO: 90 FL (ref 78–100)
PLATELET # BLD AUTO: 278 10E3/UL (ref 150–450)
RBC # BLD AUTO: 4.73 10E6/UL (ref 3.8–5.2)
SPECIMEN EXPIRATION DATE: NORMAL
WBC # BLD AUTO: 10 10E3/UL (ref 4–11)

## 2024-02-06 PROCEDURE — 76801 OB US < 14 WKS SINGLE FETUS: CPT

## 2024-02-06 PROCEDURE — 99284 EMERGENCY DEPT VISIT MOD MDM: CPT | Mod: 25

## 2024-02-06 PROCEDURE — 36415 COLL VENOUS BLD VENIPUNCTURE: CPT | Performed by: PHYSICIAN ASSISTANT

## 2024-02-06 PROCEDURE — 85027 COMPLETE CBC AUTOMATED: CPT | Performed by: FAMILY MEDICINE

## 2024-02-06 PROCEDURE — 36415 COLL VENOUS BLD VENIPUNCTURE: CPT | Performed by: FAMILY MEDICINE

## 2024-02-06 PROCEDURE — 86900 BLOOD TYPING SEROLOGIC ABO: CPT | Performed by: PHYSICIAN ASSISTANT

## 2024-02-06 PROCEDURE — 84702 CHORIONIC GONADOTROPIN TEST: CPT | Performed by: FAMILY MEDICINE

## 2024-02-06 NOTE — ED TRIAGE NOTES
Pt arrives to triage for abdominal pain and vaginal bleeding. Pt is 8 weeks pregnant and pt son jumped onto pt abdomen., Immediately after the pt began to develop vagina with  bleeding with clots. Pt has been saturating a pad about once an hour. Pt reports pain 7/10 in lower abdomen. Pt has had miscarriages in the past and is worried she may be experiencing one now.

## 2024-02-06 NOTE — ED PROVIDER NOTES
Emergency Department Encounter   NAME: Tangela Steward ; AGE: 33 year old female ; YOB: 1990 ; MRN: [id number] ; PCP: Tiera Ortiz   ED PROVIDER: Gladys Morejon PA-C    Evaluation Date & Time:   No admission date for patient encounter.    CHIEF COMPLAINT:  Abdominal Pain and Vaginal Bleeding      Impression and Plan   MDM:   Tangela Steward is a 33 year old female with a pertinent history of obesity, anxiety disorder, post partum depression, and intermittent asthma who presents to the ED by private car for evaluation of vaginal bleeding.  The patient's LMP was 12/8/2023 and she believes she is about 8 weeks pregnant. She does not yet have established OB care, and has a PMH of multiple spontaneous miscarriages.  Her 5-year-old who is autistic, jumped on her abdomen this afternoon and she developed immediate cramping and has had vaginal bleeding and clot passage which was initially heavy, though now has completely stopped.  She is clinically well-appearing, vitally stable.  No evidence of hypovolemia.  She has some mild tenderness over her pelvis though no bruising or evidence of trauma and the remainder of her abdominal exam is benign.  She appears comfortable.  No concern at this time for traumatic intra-abdominal solid organ injury or bleeding.  We discussed plan to obtain ultrasound to assess on viability of pregnancy.  Labs ordered.    Hemoglobin and hematocrit are within normal limits.  She is Rh+, no indication for RhoGAM.  Confirmed pregnancy with a beta-hCG of 43,000.  Ultrasound shows a single living intrauterine gestation at 6 weeks and 4 days with a heart rate of 122.  No subchorionic hemorrhage.  Placenta has not yet been formed.  Discussed these results with the patient which were reassuring to her.  She has had complete resolution of bleeding, no indication for pelvic exam.  We discussed her symptoms are consistent with a threatened miscarriage, and unfortunately we will  just have to monitor to see how this pregnancy progresses, and she understands this given her history of multiple miscarriages.  She was highly encouraged to establish OB and follow-up information for [facility] provided, they will be able to follow her beta-hCG quant which we discussed.  Reviewed concerning signs and symptoms return to the ER and she verbalized understanding.  Discharged home in stable condition.    Medical Decision Making    History:  Supplemental history from: Documented in chart  External Record(s) reviewed:  ED visits on 12/21/2023 and 1/8/2024    Work Up:  Chart documentation includes differential considered and any EKGs or imaging independently interpreted by provider, where specified.  In additional to work up documented, I considered the following work up: Documented in chart, if applicable.    External consultation:  Discussion of management with another provider: Documented in chart, if applicable    Complicating factors:  Care impacted by chronic illness: pregnancy, miscarriages   Care affected by social determinants of health: Access to Medical Care    Disposition considerations: Discharge. No recommendations on prescription strength medication(s). See documentation for any additional details.      ED COURSE:  6:03 PM I met and introduced myself to the patient. I gathered initial history and performed my physical exam. We discussed plan for initial workup.   7:05 PM I rechecked the patient and discussed results, discharge, follow up, and reasons to return to the ED.     At the conclusion of the encounter I discussed the results of all the tests and the disposition. The questions were answered. The patient or family acknowledged understanding and was agreeable with the care plan.    FINAL IMPRESSION:    ICD-10-CM    1. Vaginal bleeding in pregnancy, first trimester  O20.9             MEDICATIONS GIVEN IN THE EMERGENCY DEPARTMENT:  Medications - No data to display      NEW PRESCRIPTIONS  "STARTED AT TODAY'S ED VISIT:  Discharge Medication List as of 2/6/2024  7:05 PM            HPI   Patient information was obtained from: patient   Use of Intrepreter: N/A     Tangela Steward is a 33 year old female with a pertinent history of obesity, anxiety disorder, post partum depression, and intermittent asthma who presents to the ED by private car for evaluation of vaginal bleeding.    At 10 AM today, the patient's 5 year-old son jumped from the bed and onto her abdomen while she was on the floor. Since then, she has been experiencing low abdominal pain and vaginal bleeding. The bleeding is a dark red with clots. She was saturating a pad every hour. Now the bleeding has stopped. No pain medication prior to arrival. Of note, the patient is currently 6 weeks pregnant. She has had 2 successful delivery out of 7 pregnancies. LMP was 12/8/2023. Her job requires heavy lifting and standing on her feet for 12 hours.     She denies urinary symptoms and any other complaints at this time.       REVIEW OF SYSTEMS:  Pertinent positive and negative symptoms per HPI.       Medical History     No past medical history on file.    No past surgical history on file.    No family history on file.    Social History     Tobacco Use    Smoking status: Former    Smokeless tobacco: Never   Substance Use Topics    Alcohol use: No    Drug use: No       FLUoxetine (PROZAC) 10 MG capsule  ibuprofen (ADVIL,MOTRIN) 600 MG tablet          Physical Exam     First Vitals:  Patient Vitals for the past 24 hrs:   BP Temp Temp src Pulse Resp SpO2 Height Weight   02/06/24 1908 135/56 -- -- 78 20 100 % -- --   02/06/24 1521 138/68 98.4  F (36.9  C) Oral 90 20 100 % 1.6 m (5' 3\") 97.5 kg (215 lb)         PHYSICAL EXAM:   Physical Exam  Vitals and nursing note reviewed.   Constitutional:       General: She is not in acute distress.     Appearance: She is not ill-appearing or toxic-appearing.   Abdominal:      General: Abdomen is flat. Bowel sounds " are normal.      Palpations: Abdomen is soft.      Tenderness: There is no right CVA tenderness or left CVA tenderness.      Comments: Mild discomfort over her pelvis though no focal areas of tenderness.  Abdomen is soft the rebound, guarding, or evidence of peritonitis.  There is no bruising or evidence of trauma.   Neurological:      Mental Status: She is alert.             Results     LAB:  All pertinent labs reviewed and interpreted  Labs Ordered and Resulted from Time of ED Arrival to Time of ED Departure   HCG QUANTITATIVE PREGNANCY - Abnormal       Result Value    hCG Quantitative 43,207 (*)    CBC WITH PLATELETS - Normal    WBC Count 10.0      RBC Count 4.73      Hemoglobin 14.0      Hematocrit 42.7      MCV 90      MCH 29.6      MCHC 32.8      RDW 14.1      Platelet Count 278     ABO AND RH    ABO/RH(D) A POS      SPECIMEN EXPIRATION DATE [id number]         RADIOLOGY:  US OB <14 Weeks w Transvaginal Single   Final Result   IMPRESSION:    1.  Single living intrauterine gestation at 6 weeks 4 days, EDC 9/27/2024.                  ECG:  None      PROCEDURES:  None      I, Gabrielle Billings, am serving as a scribe to document services personally performed by Gladys Morejon PA-C, based on my observation and the provider's statements to me. I, Gladys Morejon PA-C attest that Gabrielle Billings is acting in a scribe capacity, has observed my performance of the services and has documented them in accordance with my direction.       Gladys Morejon PA-C   Emergency Medicine   [facility] EMERGENCY DEPARTMENT       Gladys Morejon PA-C  02/06/24 2037

## 2024-02-07 NOTE — DISCHARGE INSTRUCTIONS
As we discussed, your ultrasound shows a living pregnancy at 6 weeks and 4 days.  It is very important that you establish OB follow-up, and I have provided information for [facility] above.  Please call them tomorrow to schedule follow-up.  They can repeat your hormone level to make sure that this is increasing appropriately.  If you develop return of heavy bleeding, worsening cramping, dizziness or loss of consciousness please return to the ER for further evaluation.

## 2024-09-03 ENCOUNTER — HOSPITAL ENCOUNTER (OUTPATIENT)
Facility: HOSPITAL | Age: 34
Discharge: HOME | End: 2024-09-04
Attending: FAMILY MEDICINE | Admitting: FAMILY MEDICINE
Payer: COMMERCIAL

## 2024-09-03 PROBLEM — Z36.89 ENCOUNTER FOR TRIAGE IN PREGNANT PATIENT: Status: ACTIVE | Noted: 2024-09-03

## 2024-09-03 RX ORDER — VITAMIN A ACETATE, .BETA.-CAROTENE, ASCORBIC ACID, CHOLECALCIFEROL, .ALPHA.-TOCOPHEROL ACETATE, DL-, THIAMINE MONONITRATE, RIBOFLAVIN, NIACINAMIDE, PYRIDOXINE HYDROCHLORIDE, FOLIC ACID, CYANOCOBALAMIN, CALCIUM CARBONATE, FERROUS FUMARATE, ZINC OXIDE, AND CUPRIC OXIDE 2000; 2000; 120; 400; 22; 1.84; 3; 20; 10; 1; 12; 200; 27; 25; 2 [IU]/1; [IU]/1; MG/1; [IU]/1; MG/1; MG/1; MG/1; MG/1; MG/1; MG/1; UG/1; MG/1; MG/1; MG/1; MG/1
1 TABLET ORAL DAILY
COMMUNITY
End: 2024-09-27

## 2024-09-03 RX ORDER — LIDOCAINE 40 MG/G
CREAM TOPICAL
Status: DISCONTINUED | OUTPATIENT
Start: 2024-09-03 | End: 2024-09-04 | Stop reason: HOSPADM

## 2024-09-04 ENCOUNTER — HOSPITAL ENCOUNTER (OUTPATIENT)
Facility: HOSPITAL | Age: 34
End: 2024-09-04
Admitting: FAMILY MEDICINE
Payer: COMMERCIAL

## 2024-09-04 VITALS
DIASTOLIC BLOOD PRESSURE: 66 MMHG | RESPIRATION RATE: 15 BRPM | TEMPERATURE: 98.5 F | SYSTOLIC BLOOD PRESSURE: 134 MMHG | OXYGEN SATURATION: 97 %

## 2024-09-04 LAB
ALBUMIN MFR UR ELPH: 43.2 MG/DL
ALBUMIN SERPL BCG-MCNC: 3.2 G/DL (ref 3.5–5.2)
ALBUMIN UR-MCNC: 50 MG/DL
ALP SERPL-CCNC: 179 U/L (ref 40–150)
ALT SERPL W P-5'-P-CCNC: 15 U/L (ref 0–50)
ANION GAP SERPL CALCULATED.3IONS-SCNC: 13 MMOL/L (ref 7–15)
APPEARANCE UR: ABNORMAL
AST SERPL W P-5'-P-CCNC: 17 U/L (ref 0–45)
BACTERIA #/AREA URNS HPF: ABNORMAL /HPF
BILIRUB SERPL-MCNC: 0.4 MG/DL
BILIRUB UR QL STRIP: NEGATIVE
BUN SERPL-MCNC: 8.2 MG/DL (ref 6–20)
CALCIUM SERPL-MCNC: 8.8 MG/DL (ref 8.8–10.4)
CAOX CRY #/AREA URNS HPF: ABNORMAL /HPF
CHLORIDE SERPL-SCNC: 106 MMOL/L (ref 98–107)
COLOR UR AUTO: YELLOW
CREAT SERPL-MCNC: 0.54 MG/DL (ref 0.51–0.95)
CREAT UR-MCNC: 213.8 MG/DL
EGFRCR SERPLBLD CKD-EPI 2021: >90 ML/MIN/1.73M2
ERYTHROCYTE [DISTWIDTH] IN BLOOD BY AUTOMATED COUNT: 17.2 % (ref 10–15)
GLUCOSE SERPL-MCNC: 116 MG/DL (ref 70–99)
GLUCOSE UR STRIP-MCNC: 70 MG/DL
HCO3 SERPL-SCNC: 19 MMOL/L (ref 22–29)
HCT VFR BLD AUTO: 32 % (ref 35–47)
HGB BLD-MCNC: 9.8 G/DL (ref 11.7–15.7)
HGB UR QL STRIP: NEGATIVE
HOLD SPECIMEN: NORMAL
HOLD SPECIMEN: NORMAL
KETONES UR STRIP-MCNC: 10 MG/DL
LEUKOCYTE ESTERASE UR QL STRIP: NEGATIVE
MCH RBC QN AUTO: 26.6 PG (ref 26.5–33)
MCHC RBC AUTO-ENTMCNC: 30.6 G/DL (ref 31.5–36.5)
MCV RBC AUTO: 87 FL (ref 78–100)
MUCOUS THREADS #/AREA URNS LPF: PRESENT /LPF
NITRATE UR QL: NEGATIVE
PH UR STRIP: 6.5 [PH] (ref 5–7)
PLATELET # BLD AUTO: 167 10E3/UL (ref 150–450)
POTASSIUM SERPL-SCNC: 3.8 MMOL/L (ref 3.4–5.3)
PROT SERPL-MCNC: 6.5 G/DL (ref 6.4–8.3)
PROT/CREAT 24H UR: 0.2 MG/MG CR (ref 0–0.2)
RBC # BLD AUTO: 3.69 10E6/UL (ref 3.8–5.2)
RBC URINE: 0 /HPF
RUPTURE OF FETAL MEMBRANES BY ROM PLUS: NEGATIVE
SODIUM SERPL-SCNC: 138 MMOL/L (ref 135–145)
SP GR UR STRIP: 1.03 (ref 1–1.03)
SQUAMOUS EPITHELIAL: 8 /HPF
UROBILINOGEN UR STRIP-MCNC: 3 MG/DL
WBC # BLD AUTO: 11.3 10E3/UL (ref 4–11)
WBC URINE: 4 /HPF

## 2024-09-04 PROCEDURE — 84112 EVAL AMNIOTIC FLUID PROTEIN: CPT

## 2024-09-04 PROCEDURE — 250N000013 HC RX MED GY IP 250 OP 250 PS 637

## 2024-09-04 PROCEDURE — G0463 HOSPITAL OUTPT CLINIC VISIT: HCPCS

## 2024-09-04 PROCEDURE — 80053 COMPREHEN METABOLIC PANEL: CPT

## 2024-09-04 PROCEDURE — 84156 ASSAY OF PROTEIN URINE: CPT

## 2024-09-04 PROCEDURE — 36415 COLL VENOUS BLD VENIPUNCTURE: CPT

## 2024-09-04 PROCEDURE — 85027 COMPLETE CBC AUTOMATED: CPT

## 2024-09-04 PROCEDURE — 81001 URINALYSIS AUTO W/SCOPE: CPT

## 2024-09-04 RX ORDER — ACETAMINOPHEN 325 MG/1
975 TABLET ORAL ONCE
Status: COMPLETED | OUTPATIENT
Start: 2024-09-04 | End: 2024-09-04

## 2024-09-04 RX ADMIN — ACETAMINOPHEN 975 MG: 325 TABLET ORAL at 02:00

## 2024-09-04 ASSESSMENT — ACTIVITIES OF DAILY LIVING (ADL)
ADLS_ACUITY_SCORE: 22

## 2024-09-04 NOTE — PROGRESS NOTES
"Data: Patient presented to [facility]: 9/3/2024 11:16 PM.  Reason for maternal/fetal assessment is dizziness/lightedness, headache, vision changes, edema, and intermittent RUQ pain. Patient reports persistent, worsening dizziness since April, headache for \"a couple days\" that was unrelieved by tylenol x2, \"blurry, spotty, fuzzy\" vision changes about 2-3x per day that occurs with lightheadedness, and increased edema in fingers and legs/feet/ankles over past 2 days. Patient also states she got up from her couch a couple hours ago and had \"a puddle\" on her seat. Patient is a . Prenatal record reviewed. Pregnancy  has been complicated by obesity and GBS+.  Gestational Age 36w6d. VSS. Fetal movement decreased since 2024, when headaches started. Patient denies vaginal bleeding, abdominal pain, SOB, and chest pain. Support person (Yara) is present.   Action: Verbal consent for EFM. Triage assessment completed. Bill of rights reviewed.  Response: Patient verbalized agreement with plan. Will contact Dr. Giulia Mckeon with update and for further orders.   "

## 2024-09-04 NOTE — PROGRESS NOTES
Patient given 500 mL water and cranberry juice. Serial BP continuing q15min. UA and CBC resulted, MD notified.

## 2024-09-04 NOTE — PROGRESS NOTES
Plan to reevaluate BP in 1 hour and update MD with result. Per patient, headache is improved to 3/10 with Tylenol. PO fluids given.

## 2024-09-04 NOTE — PROGRESS NOTES
Data: Patient assessed in the [facility] for  dizziness/lightedness, headache, vision changes, edema, and intermittent RUQ pain . Cervix fingertip at outer os and 30% effaced. Fetal station -2. Membranes intact. Contractions are present. Contractions: single contraction noted in last 25 minutes and lasted 120 seconds. Uterine assessment is mild by palpation during contractions and soft by palpation at rest. See flowsheets for fetal assessment documentation.     Action: Presumed adequate fetal oxygenation documented. Discharge instructions reviewed. Patient instructed to follow up with PCP at Thursday morning appointment and to report change in fetal movement, vaginal leaking of fluid or bleeding, abdominal pain, or any concerns related to the pregnancy to provider/clinic.    Response: Orders to discharge home per Dr. FIDELIA Mckeon. Patient verbalized understanding of education and agreement with plan. Discharged to home at 04:15.

## 2024-09-04 NOTE — PROGRESS NOTES
[facility] for complete metabolic panel, ROM+, and protein random urine placed per Dr. Mckeon. Plan to perform SVE after ROM+ is obtained.    Patient is in agreement with POC. ROM+ sent. SVE per flowsheet. Protein random urine result pending.

## 2024-09-04 NOTE — PROGRESS NOTES
"OBSTETRICS TRIAGE ASSESSMENT NOTE    Tangela Steward is a 34 year old  at 36w5d gestation based on  first trimester ultrasound who has presented to maternity care for further evaluation of dizziness, headache, leg swelling, blurry vision, and nausea.    Onset on Friday. No known triggers. Has had worsening headache now with onset of blurry vision and leg swelling. Leg swelling has been on and off during this pregnancy, but not with diagnosis of pre-E at all. Headache is focal at the top of the head, no radiation, rated 7/10 at worst. Blurry vision mostly described as \"seeing spots\", no worsening with light exposure. Hydrates often during the day, drinks 1-2L of water per day. Has been feeling baby move, somewhat decreased since a week ago but otherwise >2 movements per 30 mins.    ROS also positive for:  - Fluid leakage - clear, nonpurulent, nonchunky. No bleeding. Happened when she was sitting down, noticed she was sitting in a puddle. Also loses fluid with coughing. Some discomfort with urination but no burning sensation. Reports radiation and burning as well to the R flank.  - Abdominal pain and nausea - onset since start of symptoms. Feels mostly RUQ, initially thought baby was kicking her liver. No vomiting, has regular Bms.    [place] recently? None.  3.5 hrs away.    Prenatal course reportedly uncomplicated aside from GBS+.  Does have a personal hx of anxiety and depression, previously on medications but stopped prior to pregnancy per report.    PRENATAL CARE  Seen by Dr. Stoner at [facility] [place] [facility] [place].         PAST MEDICAL HISTORY  Past Medical History:   Diagnosis Date    Depressive disorder     Uncomplicated asthma     seasonal asthma; inhaler used with overexertion       PAST SURGICAL HISTORY   History reviewed. No pertinent surgical history.    MEDICATIONS    Current Facility-Administered Medications:     lidocaine (LMX4) cream, , Topical, Q1H PRN, Lilly Read MD    " lidocaine 1 % 0.1-1 mL, 0.1-1 mL, Other, Q1H PRN, Lilly Read MD    sodium chloride (PF) 0.9% PF flush 3 mL, 3 mL, Intracatheter, Q8H, Lilly Read MD    sodium chloride (PF) 0.9% PF flush 3 mL, 3 mL, Intracatheter, q1 min prn, Lilly Read MD    SOCIAL HISTORY:   Social History     Socioeconomic History    Marital status:      Spouse name: Not on file    Number of children: Not on file    Years of education: Not on file    Highest education level: Not on file   Occupational History    Not on file   Tobacco Use    Smoking status: Former    Smokeless tobacco: Never   Substance and Sexual Activity    Alcohol use: No    Drug use: No    Sexual activity: Not Currently     Partners: Male   Other Topics Concern    Not on file   Social History Narrative    Not on file     Social Determinants of Health     Financial Resource Strain: Not on file   Food Insecurity: Not on file   Transportation Needs: Not on file   Physical Activity: Not on file   Stress: Not on file   Social Connections: Not on file   Interpersonal Safety: Not on file   Housing Stability: Not on file       PHYSICAL EXAMINATION   /70 (BP Location: Right arm, Patient Position: Semi-Fleming's, Cuff Size: Adult Large)   Temp 98.2  F (36.8  C) (Oral)   Resp 14   LMP 12/16/2023   SpO2 97%     BP on recheck 140/79.    BP Readings from Last 3 Encounters:   09/04/24 134/66   02/06/24 135/56   01/08/24 132/64         Gen: appears comfortable, no acute distress  CV: regular rate and rhythm, no murmur appreciated  Lungs: clear to ausculation, good air movement throughout  Abdomen: Gravid, non-tender fundus  Extremities: no lower extremity edema  Cervix: fingertip/30%/-2   Membranes: intact  FHT: Category 1 tracing; baseline 155 with moderate variability and accelerations, no decelerations  Contractions: irregular, 2-3 every 20 minutes    LAB RESULTS  Personally reviewed.  Recent Results (from the past 24 hour(s))    Protein  random urine    Collection Time: 09/04/24 12:43 AM   Result Value Ref Range    Total Protein Urine mg/dL 43.2   mg/dL    Total Protein Urine mg/mg Creat 0.20 0.00 - 0.20 mg/mg Cr    Creatinine Urine mg/dL 213.8 mg/dL       ASSESSMENT:  Tangela Steward is a 34 year old year old at 37w0d weeks not in labor, presenting with dizziness, headache, leg swelling..  ROM+ is negative. Labs reassuring, given urine prot/creat ratio < 0.3 and normal LFTs, reassuring that currently NOT consistent with pre-E. Likely a migraine at this time, improved with Tylenol, recommend close follow-up with PCP.    PLAN:  UA negative, with notable presence of calcium oxalate stones, however signs and symptoms not consistent with nephrolithiasis, negative for nitrites or leuk esterase, not consistent with UTI  Blood and urine tests below threshold for pre-ecclampsia, will need close monitoring  Fluids PO with improvement  Tylenol 975mg given  Close follow-up with PCP this week      Precepted patient with Dr. Sloane Kaye who agrees with the plan above.      Giulia Mckeon MD  [facility]

## 2024-09-27 ENCOUNTER — HOSPITAL ENCOUNTER (INPATIENT)
Facility: HOSPITAL | Age: 34
LOS: 2 days | Discharge: HOME | DRG: 776 | End: 2024-09-29
Attending: EMERGENCY MEDICINE | Admitting: INTERNAL MEDICINE
Payer: COMMERCIAL

## 2024-09-27 ENCOUNTER — APPOINTMENT (OUTPATIENT)
Dept: CT IMAGING | Facility: HOSPITAL | Age: 34
DRG: 776 | End: 2024-09-27
Attending: EMERGENCY MEDICINE
Payer: COMMERCIAL

## 2024-09-27 DIAGNOSIS — R03.0 ELEVATED BLOOD PRESSURE READING WITHOUT DIAGNOSIS OF HYPERTENSION: ICD-10-CM

## 2024-09-27 DIAGNOSIS — N10 PYELONEPHRITIS, ACUTE: ICD-10-CM

## 2024-09-27 DIAGNOSIS — F33.41 RECURRENT MAJOR DEPRESSION IN PARTIAL REMISSION (H): Primary | ICD-10-CM

## 2024-09-27 DIAGNOSIS — Z36.89 ENCOUNTER FOR TRIAGE IN PREGNANT PATIENT: ICD-10-CM

## 2024-09-27 LAB
ALBUMIN SERPL BCG-MCNC: 3.8 G/DL (ref 3.5–5.2)
ALBUMIN UR-MCNC: NEGATIVE MG/DL
ALP SERPL-CCNC: 145 U/L (ref 40–150)
ALT SERPL W P-5'-P-CCNC: 56 U/L (ref 0–50)
ANION GAP SERPL CALCULATED.3IONS-SCNC: 15 MMOL/L (ref 7–15)
APPEARANCE UR: CLEAR
AST SERPL W P-5'-P-CCNC: 40 U/L (ref 0–45)
BASOPHILS # BLD AUTO: 0 10E3/UL (ref 0–0.2)
BASOPHILS NFR BLD AUTO: 0 %
BILIRUB DIRECT SERPL-MCNC: <0.2 MG/DL (ref 0–0.3)
BILIRUB SERPL-MCNC: 0.6 MG/DL
BILIRUB UR QL STRIP: NEGATIVE
BUN SERPL-MCNC: 10.9 MG/DL (ref 6–20)
CALCIUM SERPL-MCNC: 8.7 MG/DL (ref 8.8–10.4)
CHLORIDE SERPL-SCNC: 102 MMOL/L (ref 98–107)
COLOR UR AUTO: ABNORMAL
CREAT SERPL-MCNC: 0.8 MG/DL (ref 0.51–0.95)
EGFRCR SERPLBLD CKD-EPI 2021: >90 ML/MIN/1.73M2
EOSINOPHIL # BLD AUTO: 0 10E3/UL (ref 0–0.7)
EOSINOPHIL NFR BLD AUTO: 0 %
ERYTHROCYTE [DISTWIDTH] IN BLOOD BY AUTOMATED COUNT: 17.9 % (ref 10–15)
FLUAV RNA SPEC QL NAA+PROBE: NEGATIVE
FLUBV RNA RESP QL NAA+PROBE: NEGATIVE
GLUCOSE SERPL-MCNC: 105 MG/DL (ref 70–99)
GLUCOSE UR STRIP-MCNC: NEGATIVE MG/DL
HCO3 SERPL-SCNC: 22 MMOL/L (ref 22–29)
HCT VFR BLD AUTO: 31.6 % (ref 35–47)
HGB BLD-MCNC: 9.6 G/DL (ref 11.7–15.7)
HGB UR QL STRIP: ABNORMAL
IMM GRANULOCYTES # BLD: 0.1 10E3/UL
IMM GRANULOCYTES NFR BLD: 1 %
KETONES UR STRIP-MCNC: NEGATIVE MG/DL
LACTATE SERPL-SCNC: 1.3 MMOL/L (ref 0.7–2)
LEUKOCYTE ESTERASE UR QL STRIP: ABNORMAL
LYMPHOCYTES # BLD AUTO: 0.3 10E3/UL (ref 0.8–5.3)
LYMPHOCYTES NFR BLD AUTO: 5 %
MCH RBC QN AUTO: 26 PG (ref 26.5–33)
MCHC RBC AUTO-ENTMCNC: 30.4 G/DL (ref 31.5–36.5)
MCV RBC AUTO: 86 FL (ref 78–100)
MONOCYTES # BLD AUTO: 0.4 10E3/UL (ref 0–1.3)
MONOCYTES NFR BLD AUTO: 6 %
NEUTROPHILS # BLD AUTO: 5.7 10E3/UL (ref 1.6–8.3)
NEUTROPHILS NFR BLD AUTO: 87 %
NITRATE UR QL: NEGATIVE
NRBC # BLD AUTO: 0 10E3/UL
NRBC BLD AUTO-RTO: 0 /100
PH UR STRIP: 7 [PH] (ref 5–7)
PLATELET # BLD AUTO: 169 10E3/UL (ref 150–450)
POTASSIUM SERPL-SCNC: 3.7 MMOL/L (ref 3.4–5.3)
PROCALCITONIN SERPL IA-MCNC: 0.32 NG/ML
PROT SERPL-MCNC: 7.2 G/DL (ref 6.4–8.3)
RBC # BLD AUTO: 3.69 10E6/UL (ref 3.8–5.2)
RBC URINE: 1 /HPF
RSV RNA SPEC NAA+PROBE: NEGATIVE
SARS-COV-2 RNA RESP QL NAA+PROBE: NEGATIVE
SODIUM SERPL-SCNC: 139 MMOL/L (ref 135–145)
SP GR UR STRIP: 1.01 (ref 1–1.03)
UROBILINOGEN UR STRIP-MCNC: <2 MG/DL
WBC # BLD AUTO: 6.6 10E3/UL (ref 4–11)
WBC URINE: 27 /HPF

## 2024-09-27 PROCEDURE — 250N000013 HC RX MED GY IP 250 OP 250 PS 637: Performed by: EMERGENCY MEDICINE

## 2024-09-27 PROCEDURE — 99285 EMERGENCY DEPT VISIT HI MDM: CPT | Mod: 25

## 2024-09-27 PROCEDURE — 120N000001 HC R&B MED SURG/OB

## 2024-09-27 PROCEDURE — 87149 DNA/RNA DIRECT PROBE: CPT | Performed by: EMERGENCY MEDICINE

## 2024-09-27 PROCEDURE — 87186 SC STD MICRODIL/AGAR DIL: CPT | Performed by: EMERGENCY MEDICINE

## 2024-09-27 PROCEDURE — 87637 SARSCOV2&INF A&B&RSV AMP PRB: CPT | Performed by: EMERGENCY MEDICINE

## 2024-09-27 PROCEDURE — 93005 ELECTROCARDIOGRAM TRACING: CPT | Performed by: EMERGENCY MEDICINE

## 2024-09-27 PROCEDURE — 250N000011 HC RX IP 250 OP 636: Performed by: EMERGENCY MEDICINE

## 2024-09-27 PROCEDURE — 87040 BLOOD CULTURE FOR BACTERIA: CPT | Performed by: EMERGENCY MEDICINE

## 2024-09-27 PROCEDURE — 84145 PROCALCITONIN (PCT): CPT | Performed by: EMERGENCY MEDICINE

## 2024-09-27 PROCEDURE — 96361 HYDRATE IV INFUSION ADD-ON: CPT

## 2024-09-27 PROCEDURE — 96375 TX/PRO/DX INJ NEW DRUG ADDON: CPT

## 2024-09-27 PROCEDURE — 81001 URINALYSIS AUTO W/SCOPE: CPT | Performed by: EMERGENCY MEDICINE

## 2024-09-27 PROCEDURE — 85025 COMPLETE CBC W/AUTO DIFF WBC: CPT | Performed by: EMERGENCY MEDICINE

## 2024-09-27 PROCEDURE — 83605 ASSAY OF LACTIC ACID: CPT | Performed by: EMERGENCY MEDICINE

## 2024-09-27 PROCEDURE — 36415 COLL VENOUS BLD VENIPUNCTURE: CPT | Performed by: EMERGENCY MEDICINE

## 2024-09-27 PROCEDURE — 82248 BILIRUBIN DIRECT: CPT | Performed by: EMERGENCY MEDICINE

## 2024-09-27 PROCEDURE — 99223 1ST HOSP IP/OBS HIGH 75: CPT | Mod: AI | Performed by: INTERNAL MEDICINE

## 2024-09-27 PROCEDURE — 87077 CULTURE AEROBIC IDENTIFY: CPT | Performed by: EMERGENCY MEDICINE

## 2024-09-27 PROCEDURE — 74177 CT ABD & PELVIS W/CONTRAST: CPT

## 2024-09-27 PROCEDURE — 96365 THER/PROPH/DIAG IV INF INIT: CPT | Mod: 59

## 2024-09-27 PROCEDURE — 258N000003 HC RX IP 258 OP 636: Performed by: EMERGENCY MEDICINE

## 2024-09-27 RX ORDER — LABETALOL HYDROCHLORIDE 5 MG/ML
20-40 INJECTION, SOLUTION INTRAVENOUS EVERY 10 MIN PRN
Status: DISCONTINUED | OUTPATIENT
Start: 2024-09-27 | End: 2024-09-29 | Stop reason: HOSPADM

## 2024-09-27 RX ORDER — HYDRALAZINE HYDROCHLORIDE 20 MG/ML
5-10 INJECTION INTRAMUSCULAR; INTRAVENOUS
Status: DISCONTINUED | OUTPATIENT
Start: 2024-09-27 | End: 2024-09-29 | Stop reason: HOSPADM

## 2024-09-27 RX ORDER — ONDANSETRON 2 MG/ML
4 INJECTION INTRAMUSCULAR; INTRAVENOUS ONCE
Status: COMPLETED | OUTPATIENT
Start: 2024-09-27 | End: 2024-09-27

## 2024-09-27 RX ORDER — SODIUM CHLORIDE, SODIUM LACTATE, POTASSIUM CHLORIDE, CALCIUM CHLORIDE 600; 310; 30; 20 MG/100ML; MG/100ML; MG/100ML; MG/100ML
10-125 INJECTION, SOLUTION INTRAVENOUS CONTINUOUS
Status: DISCONTINUED | OUTPATIENT
Start: 2024-09-27 | End: 2024-09-29 | Stop reason: HOSPADM

## 2024-09-27 RX ORDER — IOPAMIDOL 755 MG/ML
90 INJECTION, SOLUTION INTRAVASCULAR ONCE
Status: COMPLETED | OUTPATIENT
Start: 2024-09-27 | End: 2024-09-27

## 2024-09-27 RX ORDER — IBUPROFEN 600 MG/1
600 TABLET, FILM COATED ORAL ONCE
Status: COMPLETED | OUTPATIENT
Start: 2024-09-27 | End: 2024-09-27

## 2024-09-27 RX ORDER — ACETAMINOPHEN 500 MG
500-1000 TABLET ORAL EVERY 6 HOURS PRN
COMMUNITY
Start: 2024-09-20

## 2024-09-27 RX ORDER — ACETAMINOPHEN 325 MG/1
650 TABLET ORAL ONCE
Status: COMPLETED | OUTPATIENT
Start: 2024-09-27 | End: 2024-09-27

## 2024-09-27 RX ORDER — LIDOCAINE 40 MG/G
CREAM TOPICAL
Status: DISCONTINUED | OUTPATIENT
Start: 2024-09-27 | End: 2024-09-29 | Stop reason: HOSPADM

## 2024-09-27 RX ORDER — METOCLOPRAMIDE HYDROCHLORIDE 5 MG/ML
10 INJECTION INTRAMUSCULAR; INTRAVENOUS ONCE
Status: COMPLETED | OUTPATIENT
Start: 2024-09-27 | End: 2024-09-27

## 2024-09-27 RX ORDER — IBUPROFEN 600 MG/1
600 TABLET, FILM COATED ORAL EVERY 6 HOURS PRN
COMMUNITY
Start: 2024-09-20

## 2024-09-27 RX ORDER — PIPERACILLIN SODIUM, TAZOBACTAM SODIUM 3; .375 G/15ML; G/15ML
3.38 INJECTION, POWDER, LYOPHILIZED, FOR SOLUTION INTRAVENOUS ONCE
Status: COMPLETED | OUTPATIENT
Start: 2024-09-27 | End: 2024-09-27

## 2024-09-27 RX ORDER — AMPICILLIN AND SULBACTAM 2; 1 G/1; G/1
3 INJECTION, POWDER, FOR SOLUTION INTRAMUSCULAR; INTRAVENOUS ONCE
Status: COMPLETED | OUTPATIENT
Start: 2024-09-27 | End: 2024-09-27

## 2024-09-27 RX ADMIN — ONDANSETRON 4 MG: 2 INJECTION INTRAMUSCULAR; INTRAVENOUS at 22:30

## 2024-09-27 RX ADMIN — AMPICILLIN SODIUM AND SULBACTAM SODIUM 3 G: 2; 1 INJECTION, POWDER, FOR SOLUTION INTRAMUSCULAR; INTRAVENOUS at 19:52

## 2024-09-27 RX ADMIN — IBUPROFEN 600 MG: 600 TABLET, FILM COATED ORAL at 21:39

## 2024-09-27 RX ADMIN — IOPAMIDOL 90 ML: 755 INJECTION, SOLUTION INTRAVENOUS at 20:19

## 2024-09-27 RX ADMIN — SODIUM CHLORIDE, POTASSIUM CHLORIDE, SODIUM LACTATE AND CALCIUM CHLORIDE 125 ML/HR: 600; 310; 30; 20 INJECTION, SOLUTION INTRAVENOUS at 22:20

## 2024-09-27 RX ADMIN — PIPERACILLIN AND TAZOBACTAM 3.38 G: 3; .375 INJECTION, POWDER, FOR SOLUTION INTRAVENOUS at 21:44

## 2024-09-27 RX ADMIN — METOCLOPRAMIDE 10 MG: 5 INJECTION, SOLUTION INTRAMUSCULAR; INTRAVENOUS at 21:32

## 2024-09-27 RX ADMIN — SODIUM CHLORIDE, POTASSIUM CHLORIDE, SODIUM LACTATE AND CALCIUM CHLORIDE 1000 ML: 600; 310; 30; 20 INJECTION, SOLUTION INTRAVENOUS at 19:19

## 2024-09-27 RX ADMIN — ACETAMINOPHEN 650 MG: 325 TABLET ORAL at 19:18

## 2024-09-27 ASSESSMENT — ACTIVITIES OF DAILY LIVING (ADL)
ADLS_ACUITY_SCORE: 35

## 2024-09-27 ASSESSMENT — COLUMBIA-SUICIDE SEVERITY RATING SCALE - C-SSRS
6. HAVE YOU EVER DONE ANYTHING, STARTED TO DO ANYTHING, OR PREPARED TO DO ANYTHING TO END YOUR LIFE?: NO
2. HAVE YOU ACTUALLY HAD ANY THOUGHTS OF KILLING YOURSELF IN THE PAST MONTH?: NO
1. IN THE PAST MONTH, HAVE YOU WISHED YOU WERE DEAD OR WISHED YOU COULD GO TO SLEEP AND NOT WAKE UP?: NO

## 2024-09-27 NOTE — ED PROVIDER NOTES
Emergency Department Encounter     Evaluation Date & Time:   2024  6:50 PM    CHIEF COMPLAINT:  Postpartum Complications      Triage Note:The pt arrives with c/o of sudden onset of headache, fever and increased bleeding four hours ago. She is 8 days postpartum from a vaginal delivery. Her BP in triage is 191/84. She is soaking a pad every hour.      Triage Assessment (Adult)       Row Name [id number]          Triage Assessment    Airway WDL WDL        Cognitive/Neuro/Behavioral WDL    Cognitive/Neuro/Behavioral WDL WDL                     Impression and Plan       FINAL IMPRESSION:    ICD-10-CM    1. Pyelonephritis, acute  N10       2. Endometritis following delivery  O86.12     possible          ED COURSE & MEDICAL DECISION MAKIN:55 PM Introduced myself to the patient, obtained history of present illness, and performed initial physical exam at this time.   9:27 PM I spoke with OBGYN, Dr. Ellsworth, about patient's case. Recommended consult with pharmacy.      34 year old female, history of asthma, anxiety, depression and 8 days s/p induced vaginal delivery of her 3rd child at 39+1 weeks gestation requiring manual extraction of placenta, who presents for evaluation of post-partum complications.    Patient reports uterine / lower abdominal pain radiating to her right low back since her delivery. She then developed heavy vaginal bleeding saturating ~1 pad per hour for the past 4 hours with passing blood in the toilet as well. She has nausea without vomiting and tingling discomfort with urination. She has had fever today with chills.     She also reports intermittent sharp pains behind both ears with headache that feels similar to previous migraines.  Neuro exam is normal. I do not suspect ICH, mass lesion, meningitis, encephalitis and do not think emergent brain imaging and / or LP are indicated.     On exam, uterus palpable just below the umbilicus. Abdomen soft with moderate tenderness to palpation of  the lower abdomen (RLQ, suprapubic region, LLQ) and mild tenderness to palpation RUQ; no peritoneal signs or CVAT, BL.     Blood pressure on presentation elevated (191/84), which improved without intervention (119-156/56-77). I do not suspect preeclampsia / eclampsia.     Patient placed on cardiac monitor, IV access established and blood sent for labs.    Patient febrile (100.5) on presentation for which Tylenol was administered and IV fluids were initiated.    Presentation concerning for possible endometritis and OB was consulted. They agree with plan to obtain CT abdomen / pelvis imaging and recommended Unasyn empirically pending evaluation.    Patient given Unasyn after blood culture obtained.    Labs remarkable for no leukocytosis (WBC 6.6) with normal lactate (1.3) and unremarkable procalcitonin (0.32).    UA with 250 LE and 27 WBCs; urine culture pending.     Labs otherwise remarkable for no significant electrolyte derangements or renal impairment. Hepatic panel unremarkable.    COVID, influenza and RSV tests negative.    CT abdomen / pelvis performed and demonstrated:  1.  Hepatosplenomegaly with no varicosities or ascites.  2.  Abnormal appearance of the endometrial canal which can be seen with changes of infection. No abscess identified.  3.  Changes worrisome for inflammation/infection involving the upper aspect of the right urinary tract collecting system. There is asymmetric dilatation of the upper right urinary tract collecting system compared to the left with transition site seen   adjacent to the right aspect of the uterus.    OB re-consulted and she reviewed the CT images - uterine findings not unexpected given manual extraction of placenta. Findings concerning for pyelonephritis.    Unasyn likely not good coverage for urinary source. ED Pharmacist consulted and agrees with plan to administer Zosyn, which should cover for both pyelonephritis and possible endometritis.    Patient's temperature spiked  after Tylenol (102.5) for which she was given ibuprofen.     Patient admitted to Hospitalist Service with OB consulting. Patient hemodynamically stable throughout ED course.       At the conclusion of the encounter I discussed the results of all the tests and the disposition. The questions were answered. The patient and family acknowledged understanding and were agreeable with the care plan.      Medical Decision Making    History:  Obtained supplemental history:Supplemental history obtained?: No  Reviewed external records: External records reviewed?: Inpatient Record: 9/18/2024-9/21/2024 [facility] Admission     External consultation:  Did you consider but not order tests?: Work up considered but not performed and documented in chart, if applicable  Did you interpret images independently?: Independent interpretation of ECG and images noted in documentation, when applicable.  Consultation discussion with other provider:Did you involve another provider (consultant, , pharmacy, etc.)?: I discussed the care with another health care provider, see documentation for details.    Complicating factors:  Care impacted by chronic illness:Mental Health  Care significantly affected by social determinants of health:N/A    Disposition considerations: Admit.      MEDICATIONS GIVEN IN THE EMERGENCY DEPARTMENT:  Medications   lidocaine 1 % 0.1-1 mL (has no administration in time range)   lidocaine (LMX4) cream (has no administration in time range)   sodium chloride (PF) 0.9% PF flush 3 mL (3 mLs Intracatheter $Given 9/27/24 1955)   sodium chloride (PF) 0.9% PF flush 3 mL (has no administration in time range)   lactated ringers infusion (has no administration in time range)   hydrALAZINE (APRESOLINE) injection 5-10 mg (has no administration in time range)   labetalol (NORMODYNE/TRANDATE) injection 20-40 mg (has no administration in time range)   piperacillin-tazobactam (ZOSYN) 3.375 g vial to attach to  mL bag (3.375 g  Intravenous $New Bag 9/27/24 2144)   acetaminophen (TYLENOL) tablet 650 mg (650 mg Oral $Given 9/27/24 1918)   lactated ringers BOLUS 1,000 mL (1,000 mLs Intravenous $New Bag 9/27/24 1919)   ampicillin-sulbactam (UNASYN) 3 g vial to attach to  mL bag (0 g Intravenous Stopped 9/27/24 2007)   iopamidol (ISOVUE-370) solution 90 mL (90 mLs Intravenous $Given 9/27/24 2019)   metoclopramide (REGLAN) injection 10 mg (10 mg Intravenous $Given 9/27/24 2132)   ibuprofen (ADVIL/MOTRIN) tablet 600 mg (600 mg Oral $Given 9/27/24 2139)       NEW PRESCRIPTIONS STARTED AT TODAY'S ED VISIT:  New Prescriptions    No medications on file       HPI     HPI     Tangela Steward is a 34 year old female, history of asthma, anxiety and depression, who presents to this ED by private car with  for evaluation of post-partum complications.    Patient had induced vaginal delivery 8 days ago at 39+1 weeks gestation with reported manual extraction of placenta. She reports uterine / lower abdominal pain radiating to her right low back since her delivery. Then, ~4 hours ago, she developed heavy vaginal bleeding saturating ~1 pad per hour for the past 4 hours with passing a lot of blood in the toilet as well. She reports associated nausea without vomiting. She also reports tingling discomfort with urination. No RUQ abdominal pain.     She also reports intermittent sharp pains behind both ears with headache that feels similar to previous migraines. She denies vision changes.      She reports associated fever today with chills.     No chest pain, shortness of breath or URI symptoms.     She is not breastfeeding.     Per chart review:  9/18/2024 - 9/21/2024 Patient was admitted for pregnancy leading to birth. Patient's has child on 9/19/20204 at 39w1d gestation, she was induced with oxytocin. Upon discharge on 9/21/2024, patient denies heavy bleeding, severe cramping, dysuria, difficulty voiding, and dizziness. Patient is breat feeding  and bottle feeding with no issues. Patient discharged home on 9/21/2024 with acetaminophen and ibuprofen.     REVIEW OF SYSTEMS:  All other systems reviewed and are negative.      Medical History     Past Medical History:   Diagnosis Date    Depressive disorder     Uncomplicated asthma        No past surgical history on file.    No family history on file.    Social History     Tobacco Use    Smoking status: Former    Smokeless tobacco: Never   Substance Use Topics    Alcohol use: No    Drug use: No       acetaminophen (TYLENOL) 500 MG tablet  FLUoxetine (PROZAC) 20 MG capsule  ibuprofen (ADVIL/MOTRIN) 600 MG tablet        Physical Exam     First Vitals:  Patient Vitals for the past 24 hrs:   BP Temp Temp src Pulse Resp SpO2   09/27/24 2148 -- (!) 102.5  F (39.2  C) Oral -- -- --   09/27/24 2145 (!) 144/67 -- -- (!) 122 (!) 41 98 %   09/27/24 2112 (!) 144/65 -- -- 88 20 97 %   09/27/24 2102 122/56 -- -- 84 20 95 %   09/27/24 2058 -- (!) 100.6  F (38.1  C) Oral -- -- --   09/27/24 2052 122/56 -- -- 91 25 97 %   09/27/24 2000 134/67 -- -- 90 21 96 %   09/27/24 1945 119/60 -- -- 99 24 99 %   09/27/24 1915 137/65 -- -- 85 19 97 %   09/27/24 1855 (!) 156/77 -- -- -- -- 96 %   09/27/24 1846 (!) 191/84 (!) 100.5  F (38.1  C) Oral 97 18 97 %       PHYSICAL EXAM:   Physical Exam    GENERAL: Awake, alert.  In mild acute distress.   HEENT: Normocephalic, atraumatic. Pupils equal, round and reactive. Conjunctiva normal. EOMI without nystagmus. TM with questionable effusion, BL and no erythema.   NECK: No stridor.  PULMONARY: Symmetrical breath sounds without distress.  Lungs clear to auscultation bilaterally without wheezes, rhonchi or rales.  CARDIO: Regular rate and rhythm.  No significant murmur, rub or gallop.    ABDOMINAL: Uterus palpable just below the umbilicus. Abdomen soft with moderate tenderness to palpation of the lower abdomen (RLQ, suprapubic region, LLQ) and mild tenderness to palpation RUQ; no rebound tenderness  or guarding. No CVAT, BL.  :  No labial swelling or erythema. No purulent drainage. Small amount of blood on pad.   EXTREMITIES: No lower extremity swelling or edema.      NEURO: Alert and oriented to person, place and time.  Cranial nerves III-XII intact.  Strength 5/5 BL upper and lower extremities with sensation to light touch grossly intact.   PSYCH: Normal mood and affect.      Results     LAB:  All pertinent labs reviewed and interpreted  Labs Ordered and Resulted from Time of ED Arrival to Time of ED Departure   BASIC METABOLIC PANEL - Abnormal       Result Value    Sodium 139      Potassium 3.7      Chloride 102      Carbon Dioxide (CO2) 22      Anion Gap 15      Urea Nitrogen 10.9      Creatinine 0.80      GFR Estimate >90      Calcium 8.7 (*)     Glucose 105 (*)    HEPATIC FUNCTION PANEL - Abnormal    Protein Total 7.2      Albumin 3.8      Bilirubin Total 0.6      Alkaline Phosphatase 145      AST 40      ALT 56 (*)     Bilirubin Direct <0.20     ROUTINE UA WITH MICROSCOPIC REFLEX TO CULTURE - Abnormal    Color Urine Light Yellow      Appearance Urine Clear      Glucose Urine Negative      Bilirubin Urine Negative      Ketones Urine Negative      Specific Gravity Urine 1.008      Blood Urine 0.1 mg/dL (*)     pH Urine 7.0      Protein Albumin Urine Negative      Urobilinogen Urine <2.0      Nitrite Urine Negative      Leukocyte Esterase Urine 250 Nathalia/uL (*)     RBC Urine 1      WBC Urine 27 (*)    CBC WITH PLATELETS AND DIFFERENTIAL - Abnormal    WBC Count 6.6      RBC Count 3.69 (*)     Hemoglobin 9.6 (*)     Hematocrit 31.6 (*)     MCV 86      MCH 26.0 (*)     MCHC 30.4 (*)     RDW 17.9 (*)     Platelet Count 169      % Neutrophils 87      % Lymphocytes 5      % Monocytes 6      % Eosinophils 0      % Basophils 0      % Immature Granulocytes 1      NRBCs per 100 WBC 0      Absolute Neutrophils 5.7      Absolute Lymphocytes 0.3 (*)     Absolute Monocytes 0.4      Absolute Eosinophils 0.0      Absolute  Basophils 0.0      Absolute Immature Granulocytes 0.1      Absolute NRBCs 0.0     LACTIC ACID WHOLE BLOOD - Normal    Lactic Acid 1.3     PROCALCITONIN - Normal    Procalcitonin 0.32     INFLUENZA A/B, RSV, & SARS-COV2 PCR - Normal    Influenza A PCR Negative      Influenza B PCR Negative      RSV PCR Negative      SARS CoV2 PCR Negative     BLOOD CULTURE   URINE CULTURE       RADIOLOGY:  CT Abdomen Pelvis w Contrast   Final Result   IMPRESSION:    1.  Hepatosplenomegaly with no varicosities or ascites.      2.  Abnormal appearance of the endometrial canal which can be seen with changes of infection. No abscess identified.      3.  Changes worrisome for inflammation/infection involving the upper aspect of the right urinary tract collecting system. There is asymmetric dilatation of the upper right urinary tract collecting system compared to the left with transition site seen    adjacent to the right aspect of the uterus.        EC2024, 19:08; NSR with rate of 91 bpm; normal intervals; incomplete RBBB; no ST-T wave changes consistent with ACS or pericarditis; no previous EKG available for comparison    EKG independently reviewed and interpreted by Gloria Pastrana MD        I, Tori Cansa, am serving as a scribe to document services personally performed by Gloria Pastrana MD based on my observation and the provider's statements to me. I, Gloria Pastrana MD attest that Tori Canas is acting in a scribe capacity, has observed my performance of the services and has documented them in accordance with my direction.    Gloria Pastrana MD  Emergency Medicine  [facility] EMERGENCY DEPARTMENT           Gloria Pastrana MD  [id number] [id number]

## 2024-09-27 NOTE — ED TRIAGE NOTES
The pt arrives with c/o of sudden onset of headache, fever and increased bleeding four hours ago. She is 8 days postpartum from a vaginal delivery. Her BP in triage is 191/84. She is soaking a pad every hour.      Triage Assessment (Adult)       Row Name 09/27/24 1813          Triage Assessment    Airway WDL WDL        Cognitive/Neuro/Behavioral WDL    Cognitive/Neuro/Behavioral WDL WDL

## 2024-09-28 LAB
ABO/RH(D): NORMAL
ACINETOBACTER SPECIES: NOT DETECTED
ALBUMIN SERPL BCG-MCNC: 3.3 G/DL (ref 3.5–5.2)
ALP SERPL-CCNC: 138 U/L (ref 40–150)
ALT SERPL W P-5'-P-CCNC: 44 U/L (ref 0–50)
ANION GAP SERPL CALCULATED.3IONS-SCNC: 9 MMOL/L (ref 7–15)
ANTIBODY SCREEN: NEGATIVE
AST SERPL W P-5'-P-CCNC: 30 U/L (ref 0–45)
ATRIAL RATE - MUSE: 91 BPM
BASOPHILS # BLD AUTO: 0 10E3/UL (ref 0–0.2)
BASOPHILS NFR BLD AUTO: 0 %
BILIRUB SERPL-MCNC: 0.6 MG/DL
BUN SERPL-MCNC: 8.4 MG/DL (ref 6–20)
CALCIUM SERPL-MCNC: 8.1 MG/DL (ref 8.8–10.4)
CHLORIDE SERPL-SCNC: 109 MMOL/L (ref 98–107)
CITROBACTER SPECIES: NOT DETECTED
CREAT SERPL-MCNC: 0.77 MG/DL (ref 0.51–0.95)
CTX-M: NOT DETECTED
DIASTOLIC BLOOD PRESSURE - MUSE: 58 MMHG
EGFRCR SERPLBLD CKD-EPI 2021: >90 ML/MIN/1.73M2
ENTEROBACTER SPECIES: NOT DETECTED
EOSINOPHIL # BLD AUTO: 0 10E3/UL (ref 0–0.7)
EOSINOPHIL NFR BLD AUTO: 0 %
ERYTHROCYTE [DISTWIDTH] IN BLOOD BY AUTOMATED COUNT: 18 % (ref 10–15)
ESCHERICHIA COLI: DETECTED
GLUCOSE SERPL-MCNC: 111 MG/DL (ref 70–99)
HCO3 SERPL-SCNC: 23 MMOL/L (ref 22–29)
HCT VFR BLD AUTO: 27.8 % (ref 35–47)
HGB BLD-MCNC: 8.3 G/DL (ref 11.7–15.7)
HGB BLD-MCNC: 8.5 G/DL (ref 11.7–15.7)
HGB BLD-MCNC: 8.6 G/DL (ref 11.7–15.7)
IMM GRANULOCYTES # BLD: 0.1 10E3/UL
IMM GRANULOCYTES NFR BLD: 1 %
IMP: NOT DETECTED
INTERPRETATION ECG - MUSE: NORMAL
KLEBSIELLA OXYTOCA: NOT DETECTED
KLEBSIELLA PNEUMONIAE: NOT DETECTED
KPC: NOT DETECTED
LYMPHOCYTES # BLD AUTO: 0.8 10E3/UL (ref 0.8–5.3)
LYMPHOCYTES NFR BLD AUTO: 10 %
MCH RBC QN AUTO: 26.5 PG (ref 26.5–33)
MCHC RBC AUTO-ENTMCNC: 30.9 G/DL (ref 31.5–36.5)
MCV RBC AUTO: 86 FL (ref 78–100)
MONOCYTES # BLD AUTO: 0.7 10E3/UL (ref 0–1.3)
MONOCYTES NFR BLD AUTO: 8 %
NDM: NOT DETECTED
NEUTROPHILS # BLD AUTO: 6.9 10E3/UL (ref 1.6–8.3)
NEUTROPHILS NFR BLD AUTO: 81 %
NRBC # BLD AUTO: 0 10E3/UL
NRBC BLD AUTO-RTO: 0 /100
OXA (DETECTED/NOT DETECTED): NOT DETECTED
P AXIS - MUSE: 51 DEGREES
PLATELET # BLD AUTO: 191 10E3/UL (ref 150–450)
POTASSIUM SERPL-SCNC: 3.7 MMOL/L (ref 3.4–5.3)
PR INTERVAL - MUSE: 152 MS
PROT SERPL-MCNC: 6.2 G/DL (ref 6.4–8.3)
PROTEUS SPECIES: NOT DETECTED
PSEUDOMONAS AERUGINOSA: NOT DETECTED
QRS DURATION - MUSE: 96 MS
QT - MUSE: 362 MS
QTC - MUSE: 445 MS
R AXIS - MUSE: 41 DEGREES
RBC # BLD AUTO: 3.24 10E6/UL (ref 3.8–5.2)
SODIUM SERPL-SCNC: 141 MMOL/L (ref 135–145)
SPECIMEN EXPIRATION DATE: NORMAL
SYSTOLIC BLOOD PRESSURE - MUSE: 147 MMHG
T AXIS - MUSE: 78 DEGREES
VENTRICULAR RATE- MUSE: 91 BPM
VIM: NOT DETECTED
WBC # BLD AUTO: 8.5 10E3/UL (ref 4–11)

## 2024-09-28 PROCEDURE — 85018 HEMOGLOBIN: CPT | Performed by: OBSTETRICS & GYNECOLOGY

## 2024-09-28 PROCEDURE — 85018 HEMOGLOBIN: CPT | Performed by: INTERNAL MEDICINE

## 2024-09-28 PROCEDURE — 250N000011 HC RX IP 250 OP 636: Performed by: INTERNAL MEDICINE

## 2024-09-28 PROCEDURE — 86901 BLOOD TYPING SEROLOGIC RH(D): CPT | Performed by: INTERNAL MEDICINE

## 2024-09-28 PROCEDURE — 85025 COMPLETE CBC W/AUTO DIFF WBC: CPT | Performed by: INTERNAL MEDICINE

## 2024-09-28 PROCEDURE — 36415 COLL VENOUS BLD VENIPUNCTURE: CPT | Performed by: HOSPITALIST

## 2024-09-28 PROCEDURE — 258N000003 HC RX IP 258 OP 636: Performed by: OBSTETRICS & GYNECOLOGY

## 2024-09-28 PROCEDURE — 120N000001 HC R&B MED SURG/OB

## 2024-09-28 PROCEDURE — 87040 BLOOD CULTURE FOR BACTERIA: CPT | Performed by: HOSPITALIST

## 2024-09-28 PROCEDURE — 258N000003 HC RX IP 258 OP 636: Performed by: EMERGENCY MEDICINE

## 2024-09-28 PROCEDURE — 99232 SBSQ HOSP IP/OBS MODERATE 35: CPT | Performed by: HOSPITALIST

## 2024-09-28 PROCEDURE — 250N000011 HC RX IP 250 OP 636: Performed by: OBSTETRICS & GYNECOLOGY

## 2024-09-28 PROCEDURE — 82040 ASSAY OF SERUM ALBUMIN: CPT | Performed by: INTERNAL MEDICINE

## 2024-09-28 PROCEDURE — 36415 COLL VENOUS BLD VENIPUNCTURE: CPT | Performed by: OBSTETRICS & GYNECOLOGY

## 2024-09-28 PROCEDURE — 86900 BLOOD TYPING SEROLOGIC ABO: CPT | Performed by: INTERNAL MEDICINE

## 2024-09-28 PROCEDURE — 250N000013 HC RX MED GY IP 250 OP 250 PS 637: Performed by: OBSTETRICS & GYNECOLOGY

## 2024-09-28 PROCEDURE — 36415 COLL VENOUS BLD VENIPUNCTURE: CPT | Performed by: INTERNAL MEDICINE

## 2024-09-28 RX ORDER — LIDOCAINE 40 MG/G
CREAM TOPICAL
Status: DISCONTINUED | OUTPATIENT
Start: 2024-09-28 | End: 2024-09-29 | Stop reason: HOSPADM

## 2024-09-28 RX ORDER — NALOXONE HYDROCHLORIDE 0.4 MG/ML
0.2 INJECTION, SOLUTION INTRAMUSCULAR; INTRAVENOUS; SUBCUTANEOUS
Status: DISCONTINUED | OUTPATIENT
Start: 2024-09-28 | End: 2024-09-29 | Stop reason: HOSPADM

## 2024-09-28 RX ORDER — VANCOMYCIN HYDROCHLORIDE 1 G/200ML
1000 INJECTION, SOLUTION INTRAVENOUS EVERY 12 HOURS
Status: DISCONTINUED | OUTPATIENT
Start: 2024-09-28 | End: 2024-09-28

## 2024-09-28 RX ORDER — HYDROMORPHONE HCL IN WATER/PF 6 MG/30 ML
0.2 PATIENT CONTROLLED ANALGESIA SYRINGE INTRAVENOUS
Status: DISCONTINUED | OUTPATIENT
Start: 2024-09-28 | End: 2024-09-29 | Stop reason: HOSPADM

## 2024-09-28 RX ORDER — HYDROMORPHONE HCL IN WATER/PF 6 MG/30 ML
0.4 PATIENT CONTROLLED ANALGESIA SYRINGE INTRAVENOUS
Status: DISCONTINUED | OUTPATIENT
Start: 2024-09-28 | End: 2024-09-29 | Stop reason: HOSPADM

## 2024-09-28 RX ORDER — NICOTINE POLACRILEX 4 MG
15-30 LOZENGE BUCCAL
Status: DISCONTINUED | OUTPATIENT
Start: 2024-09-28 | End: 2024-09-29 | Stop reason: HOSPADM

## 2024-09-28 RX ORDER — AMOXICILLIN 250 MG
1 CAPSULE ORAL 2 TIMES DAILY PRN
Status: DISCONTINUED | OUTPATIENT
Start: 2024-09-28 | End: 2024-09-29 | Stop reason: HOSPADM

## 2024-09-28 RX ORDER — DEXTROSE MONOHYDRATE 25 G/50ML
25-50 INJECTION, SOLUTION INTRAVENOUS
Status: DISCONTINUED | OUTPATIENT
Start: 2024-09-28 | End: 2024-09-29 | Stop reason: HOSPADM

## 2024-09-28 RX ORDER — DEXTROSE, SODIUM CHLORIDE, SODIUM LACTATE, POTASSIUM CHLORIDE, AND CALCIUM CHLORIDE 5; .6; .31; .03; .02 G/100ML; G/100ML; G/100ML; G/100ML; G/100ML
INJECTION, SOLUTION INTRAVENOUS CONTINUOUS
Status: DISCONTINUED | OUTPATIENT
Start: 2024-09-28 | End: 2024-09-28

## 2024-09-28 RX ORDER — AMOXICILLIN 250 MG
2 CAPSULE ORAL 2 TIMES DAILY PRN
Status: DISCONTINUED | OUTPATIENT
Start: 2024-09-28 | End: 2024-09-29 | Stop reason: HOSPADM

## 2024-09-28 RX ORDER — CEFAZOLIN SODIUM 1 G/50ML
2500 SOLUTION INTRAVENOUS ONCE
Status: COMPLETED | OUTPATIENT
Start: 2024-09-28 | End: 2024-09-28

## 2024-09-28 RX ORDER — NALOXONE HYDROCHLORIDE 0.4 MG/ML
0.4 INJECTION, SOLUTION INTRAMUSCULAR; INTRAVENOUS; SUBCUTANEOUS
Status: DISCONTINUED | OUTPATIENT
Start: 2024-09-28 | End: 2024-09-29 | Stop reason: HOSPADM

## 2024-09-28 RX ORDER — PIPERACILLIN SODIUM, TAZOBACTAM SODIUM 3; .375 G/15ML; G/15ML
3.38 INJECTION, POWDER, LYOPHILIZED, FOR SOLUTION INTRAVENOUS EVERY 8 HOURS
Status: DISCONTINUED | OUTPATIENT
Start: 2024-09-28 | End: 2024-09-29 | Stop reason: HOSPADM

## 2024-09-28 RX ORDER — ACETAMINOPHEN 650 MG/1
650 SUPPOSITORY RECTAL EVERY 4 HOURS PRN
Status: DISCONTINUED | OUTPATIENT
Start: 2024-09-28 | End: 2024-09-29 | Stop reason: HOSPADM

## 2024-09-28 RX ORDER — ONDANSETRON 2 MG/ML
4 INJECTION INTRAMUSCULAR; INTRAVENOUS EVERY 6 HOURS PRN
Status: DISCONTINUED | OUTPATIENT
Start: 2024-09-28 | End: 2024-09-29 | Stop reason: HOSPADM

## 2024-09-28 RX ORDER — ONDANSETRON 4 MG/1
4 TABLET, ORALLY DISINTEGRATING ORAL EVERY 6 HOURS PRN
Status: DISCONTINUED | OUTPATIENT
Start: 2024-09-28 | End: 2024-09-29 | Stop reason: HOSPADM

## 2024-09-28 RX ORDER — ACETAMINOPHEN 325 MG/1
650 TABLET ORAL EVERY 4 HOURS PRN
Status: DISCONTINUED | OUTPATIENT
Start: 2024-09-28 | End: 2024-09-29 | Stop reason: HOSPADM

## 2024-09-28 RX ORDER — CALCIUM CARBONATE 500 MG/1
1000 TABLET, CHEWABLE ORAL 4 TIMES DAILY PRN
Status: DISCONTINUED | OUTPATIENT
Start: 2024-09-28 | End: 2024-09-29 | Stop reason: HOSPADM

## 2024-09-28 RX ADMIN — SODIUM CHLORIDE, POTASSIUM CHLORIDE, SODIUM LACTATE AND CALCIUM CHLORIDE 125 ML/HR: 600; 310; 30; 20 INJECTION, SOLUTION INTRAVENOUS at 20:48

## 2024-09-28 RX ADMIN — PIPERACILLIN AND TAZOBACTAM 3.38 G: 3; .375 INJECTION, POWDER, FOR SOLUTION INTRAVENOUS at 11:58

## 2024-09-28 RX ADMIN — FAMOTIDINE 20 MG: 10 INJECTION, SOLUTION INTRAVENOUS at 00:42

## 2024-09-28 RX ADMIN — SODIUM CHLORIDE 2500 MG: 9 INJECTION, SOLUTION INTRAVENOUS at 01:40

## 2024-09-28 RX ADMIN — PIPERACILLIN AND TAZOBACTAM 3.38 G: 3; .375 INJECTION, POWDER, FOR SOLUTION INTRAVENOUS at 20:46

## 2024-09-28 RX ADMIN — ACETAMINOPHEN 650 MG: 325 TABLET ORAL at 18:07

## 2024-09-28 RX ADMIN — ACETAMINOPHEN 650 MG: 325 TABLET ORAL at 09:14

## 2024-09-28 RX ADMIN — SODIUM CHLORIDE, POTASSIUM CHLORIDE, SODIUM LACTATE AND CALCIUM CHLORIDE 125 ML/HR: 600; 310; 30; 20 INJECTION, SOLUTION INTRAVENOUS at 09:00

## 2024-09-28 RX ADMIN — FAMOTIDINE 20 MG: 10 INJECTION, SOLUTION INTRAVENOUS at 11:58

## 2024-09-28 RX ADMIN — PIPERACILLIN AND TAZOBACTAM 3.38 G: 3; .375 INJECTION, POWDER, FOR SOLUTION INTRAVENOUS at 04:05

## 2024-09-28 ASSESSMENT — ACTIVITIES OF DAILY LIVING (ADL)
ADLS_ACUITY_SCORE: 18
ADLS_ACUITY_SCORE: 35
ADLS_ACUITY_SCORE: 36
ADLS_ACUITY_SCORE: 18
ADLS_ACUITY_SCORE: 35
ADLS_ACUITY_SCORE: 18
ADLS_ACUITY_SCORE: 18
ADLS_ACUITY_SCORE: 35
ADLS_ACUITY_SCORE: 18
ADLS_ACUITY_SCORE: 36
ADLS_ACUITY_SCORE: 35
ADLS_ACUITY_SCORE: 36
ADLS_ACUITY_SCORE: 18

## 2024-09-28 NOTE — PROVIDER NOTIFICATION
09/28/24 0155   Provider Notification   Provider Name/Title Dr Jackson   Method of Notification Phone   Request Evaluate-Remote   Notification Reason Lab Results  (Spoke to Dr Jackson regarding patients hgb of of 8.5 and timing of next draw. She would like a redraw in 4 hours as stated in the order set and will re-evaluate posible iron infusion in the morning)

## 2024-09-28 NOTE — ED NOTES
Pt vomited large amount.  Pt cleaned up, gown and bedding changed.  Pt not shivering any longer, states feeling hot.  Pt also incontinent of urine on floor when changing brief

## 2024-09-28 NOTE — PROGRESS NOTES
[facility]    Medicine Progress Note - Hospitalist Service    Date of Admission:  2024    Assessment & Plan   Tangela is a 34-year-old postpartum, status post  on  presented here with chief complaints of fever, abdominal pain.  Workup revealed sepsis secondary to acute endometritis and pyelonephritis.    Acute pyelonephritis  -Ct abdomen reviewed   -UA suggestive of UTI,  -Urine culture pending  -Blood culture gram-negative bacilli, and E. coli detected on Verigene  -Continue Zosyn. Sop Vancomycin  -Monitor  labs and fever curve    Acute endometritis  -Ct abdomen reviewed,  -Pt s/p  on  with manual extraction of placenta  -Continue Zosyn    ABL anemia  -Hb so far stable, monitor closely  -Transfuse if Hb <7    Depression-Continue PTA fluoxetine.  -Close follow up with PCP and OBGYN out-pt              Diet: Regular Diet Adult    DVT Prophylaxis: Low Risk/Ambulatory with no VTE prophylaxis indicated  Alexander Catheter: Not present  Lines: None     Cardiac Monitoring: ACTIVE order. Indication: Tachyarrhythmias, acute (48 hours)  Code Status: Full Code      Clinically Significant Risk Factors Present on Admission          # Hypocalcemia: Lowest Ca = 8.1 mg/dL in last 2 days, will monitor and replace as appropriate     # Hypoalbuminemia: Lowest albumin = 3.3 g/dL at 2024  4:26 AM, will monitor as appropriate          # Anemia: based on hgb <11       # Severe Obesity: Estimated body mass index is 45.8 kg/m  as calculated from the following:    Height as of this encounter: 1.524 m (5').    Weight as of this encounter: 106.4 kg (234 lb 8 oz).       # Asthma: noted on problem list        Disposition Plan     Medically Ready for Discharge: Anticipated in 2-4 Days             Esperanza Avery MD  Hospitalist Service  [facility]  Securely message with [employer] (more info)  Text page via [employer] Paging/Directory    ______________________________________________________________________    Interval History   .  Patient is new to me. Chart reviewed and events noted.  Patient seen and examined.   Abd pain improved. No more fevers. No N/V. No vaginal bleeding now. Pt not breast feeding.       Physical Exam   Vital Signs: Temp: 98.2  F (36.8  C) Temp src: Oral BP: 121/57 Pulse: 95   Resp: 18 SpO2: 98 % O2 Device: None (Room air)    Weight: 234 lbs 8 oz    General: Pleasant, NAD  HEENT:EOMI, AT,NC  RS:Non labored breathing  Neurology:Grossly normal  Psy:Approrpiate affect      Medical Decision Making       >45 MINUTES SPENT BY ME on the date of service doing chart review, history, exam, documentation & further activities per the note.  MANAGEMENT DISCUSSED with the following over the past 24 hours: Patient, spouse by bedside.       Data     I have personally reviewed the following data over the past 24 hrs:    8.5  \   8.3 (L)   / 191     141 109 (H) 8.4 /  111 (H)   3.7 23 0.77 \     ALT: 44 AST: 30 AP: 138 TBILI: 0.6   ALB: 3.3 (L) TOT PROTEIN: 6.2 (L) LIPASE: N/A     Procal: 0.32 CRP: N/A Lactic Acid: 1.3

## 2024-09-28 NOTE — H&P
[facility]    History and Physical - Hospitalist Service       Date of Admission:  9/27/2024    Assessment & Plan   Tangela Steward is a 34 year old Postpartum female who is being admitted with sepsis due to acute endometritis with superimposed pyelonephritis.    Patient Active Problem List   Diagnosis    Pregnant    HPV in female    Class 3 obesity (H)    Anxiety disorder, unspecified    History of abnormal cervical Pap smear    Intermittent asthma    Post partum depression    Recurrent major depression in partial remission (H)    Encounter for triage in pregnant patient    Pyelonephritis, acute    Endometritis following delivery        1.Postpartum Delivery (8 days ago)    Likely Cause: Recent vaginal delivery with manual extraction of the placenta, increasing the risk for infection and postpartum complications.  Management Plan:  Monitor for postpartum complications such as infection or hemorrhage.  Currently has ongoing vaginal bleeding and acute pyelonephritis   Supportive care, including hydration and pain management.  Close follow-up with OB/GYN for any persistent or worsening symptoms.    2. Acute Endometritis    Likely Cause: Postpartum infection, potentially due to retained placental tissue or postpartum changes. Imaging did not show this however. Per report, placenta was manually removed making this a risk factor. Continue current antibiotics  Management Plan:  Continue broad-spectrum antibiotics Zosyn.  Obtain blood and urine cultures; adjust antibiotics based on culture results.  Monitor for fever resolution, reduction in vaginal bleeding, and improvement in abdominal pain.  Consider repeat imaging or further OB/GYN consultation if symptoms do not improve.    3.Acute Pyelonephritis    Likely Cause: Urinary tract infection, possibly secondary to postpartum changes or catheter use.  Management Plan:  Continue Zosyn for urinary tract coverage.  Monitor urine culture results and  adjust antibiotics accordingly.  Ensure adequate hydration to support renal function and prevent worsening infection.  Follow up with repeat urinalysis and culture to ensure resolution.    4. Acute Blood Loss Anemia    Likely Cause: Postpartum hemorrhage, with ongoing heavy vaginal bleeding (soaking a pad every hour).  Management Plan:  Monitor hemoglobin and hematocrit levels.  Ensure the patient remains hemodynamically stable.  Consider transfusion if indicated by labs or clinical symptoms.  Monitor vaginal bleeding closely and consult OB/GYN for potential interventions if bleeding persists or worsens.    5.Asthma    Likely Cause: Pre-existing condition, stable currently.  Management Plan:  Continue routine asthma medications.  Monitor for any signs of respiratory distress, especially with ongoing infection and fever.  Adjust asthma treatment if needed, based on respiratory status.    6. Depression    Likely Cause: Pre-existing condition, postpartum period may exacerbate symptoms.  Management Plan:  Screen for postpartum depression and adjust psychiatric medications if necessary.  Ensure ongoing support and follow-up with mental health services.  Consider involving social work or a mental health specialist if the patient reports worsening depressive symptoms.    7. Rest of Patient s Medical Problems    Management Plan: Remain unchanged during this admission, but continue monitoring for any interactions or impacts from the current medical issues and treatments.    Diet:  NPO. Hypoglycemia protocol ordered   DVT Prophylaxis: Pneumatic Compression Devices  Alexander Catheter: Not present  Lines: None     Cardiac Monitoring: None  Code Status:  Ful    Clinically Significant Risk Factors Present on Admission                     # Anemia: based on hgb <11           # Asthma: noted on problem list        Disposition Plan     Medically Ready for Discharge: Anticipated in 2-4 Days           Silvia Najera MD  Hospitalist  Service  [facility]  Securely message with [employer] (more info)  Text page via [employer] Paging/Directory     ______________________________________________________________________    Chief Complaint     headache, fever and increased bleeding four hours ago       History of Present Illness   Tangela Steward is a 34 year old Postpartum female who is being admitted with sepsis due to acute endometritis with superimposed pyelonephritis.    The patient is a 34-year-old female, 8 days postpartum from a vaginal delivery, who presents with complaints of a sudden onset of headache, fever, and increased vaginal bleeding for the past four hours. She reports soaking a pad every hour since the onset of bleeding. Upon triage, her blood pressure was elevated at 191/84.    Per history, patent presented to the ER with body aches, fevers and increased vaginal bleeding. She described  associated symptoms of abdominal pain, fever, and chills. She denies chest pain, shortness of breath, dizziness, or changes in vision. Her delivery was complicated by the need for manual extraction of the placenta. Due to her current symptoms, there was concern for possible postpartum endometritis and postpartum hemorrhage.    OB/GYN Consultation: The OB/GYN team was consulted and agreed with obtaining a CT scan, which revealed findings consistent with postpartum changes, potentially related to the manual extraction of the placenta. There is a clinical suspicion for endometritis or a possible urinary tract infection (UTI).    Preliminary labs done in the ER included preliminary labs done in the ER showed a BMP which was unremarkable.  CBC showed a white count of 6.6, hemoglobin 9.6 which was low from a baseline of 14 from 9 months ago.  UA showed mild pyuria.    CT scan done showed results below:   EXAM: CT ABDOMEN PELVIS W CONTRAST  LOCATION: [facility]  DATE: 9/27/2024     INDICATION: Abdominal pain,  increased vaginal bleeding, fever. Question endometritis.  COMPARISON: CT 4/5/2023  TECHNIQUE: CT scan of the abdomen and pelvis was performed following injection of IV contrast. Multiplanar reformats were obtained. Dose reduction techniques were used.  CONTRAST: Isovue 370 90ML     FINDINGS:   LOWER CHEST: Normal.     HEPATOBILIARY: Mild hepatomegaly. The liver, bile ducts, gallbladder, hepatic veins, and portal veins are otherwise normal.     PANCREAS: Normal.      SPLEEN: Splenomegaly with the spleen measuring 16.4 cm in greatest longitudinal dimension. On previous CT 4/5/2023 the spleen measured 12.7 cm.     ADRENAL GLANDS: Normal.     KIDNEYS/BLADDER: There is asymmetric dilatation seen involving the proximal and mid portions of the right ureter with transition site seen just below the pelvic brim adjacent to the right aspect of the uterus. No underlying stones or masses identified.   There is asymmetric increased uroepithelial enhancement involving the upper aspect of the right urinary tract collecting system when compared to left and changes of inflammation is suspected. A urinalysis would be of benefit. There is no evidence for a   urinary tract abscess. The bladder is normal in appearance.     BOWEL: Normal.     LYMPH NODES: Normal.     VASCULATURE: Normal.     PELVIC ORGANS: The endometrial stripe measures approximately 23.5 mm with some heterogenous density seen within the endometrium to include some areas of gas. I cannot rule out changes of infection given patient's clinical history. No evidence for a   well-defined abscess identified.     MUSCULOSKELETAL: Mild osteopenia. Minimal hypertrophic changes in the spine.                                                                      IMPRESSION:   1.  Hepatosplenomegaly with no varicosities or ascites.     2.  Abnormal appearance of the endometrial canal which can be seen with changes of infection. No abscess identified.     3.  Changes worrisome for  inflammation/infection involving the upper aspect of the right urinary tract collecting system. There is asymmetric dilatation of the upper right urinary tract collecting system compared to the left with transition site seen   adjacent to the right aspect of the uterus.      ER intervention included as initially starting Unasyn due to OB/GYN's recommendation however with the concern for pyelonephritis, this was switched to Zosyn.  Patient is being admitted for further inpatient cares and for gynecology evaluation in the morning.    The patient will be admitted to the medicine service, with a plan for further evaluation and monitoring in a med/tele unit. There is currently no need for intubation, but close monitoring is necessary.    Further management will include monitoring blood pressure, continued antibiotic therapy, and observation for signs of hemorrhage or worsening infection.        Past Medical History    Past Medical History:   Diagnosis Date    Depressive disorder     Uncomplicated asthma     seasonal asthma; inhaler used with overexertion   Medical History    Medical History  Medical History Date Comments   Anxiety       Migraine       Eczema       Intermittent asthma       History of abnormal cervical Pap smear   2013 ASCUS Negative HPV, 2014 NILM, 2017 ASCUS HPV Positive for High Risk HPV types other than 16 or 18, 2018 NILM       Past Surgical History   Procedures  Procedure Name Priority Date/Time Associated Diagnosis Comments   PAP GYN CYTOLOGY CP Routine 07/27/2018 3:51 PM CDT Encounter for supervision of other normal pregnancy, first trimester   Encounter for screening for malignant neoplasm of cervix         Prior to Admission Medications   Prior to Admission Medications   Prescriptions Last Dose Informant Patient Reported? Taking?   FLUoxetine (PROZAC) 20 MG capsule 9/27/2024 at am  Yes Yes   Sig: Take 1 capsule by mouth daily.   acetaminophen (TYLENOL) 500 MG tablet 9/27/2024 at am  Yes Yes    Sig: Take 500-1,000 mg by mouth every 6 hours as needed.   ibuprofen (ADVIL/MOTRIN) 600 MG tablet 9/27/2024 at noon  Yes Yes   Sig: Take 600 mg by mouth every 6 hours as needed.      Facility-Administered Medications: None        Review of Systems    The 10 point Review of Systems is negative other than noted in the HPI or here.     Social History   I have reviewed this patient's social history and updated it with pertinent information if needed.  Social History     Tobacco Use    Smoking status: Former    Smokeless tobacco: Never   Substance Use Topics    Alcohol use: No    Drug use: No         Family History     Allergies   Allergies   Allergen Reactions    Bupropion Other (See Comments)     suicidality  suicidality  suicidality      Hydrocodone-Acetaminophen Nausea and Vomiting     Not allergic to acetaminophen    Buspirone Nausea     Nausea, but significant        Physical Exam   Vital Signs: Temp: (!) 102.5  F (39.2  C) Temp src: Oral BP: (!) 144/67 Pulse: (!) 122   Resp: (!) 41 SpO2: 98 % O2 Device: None (Room air)    Weight: 0 lbs 0 oz      General Aox3, appropriate affect, NAD, on RA  HEENT  MMM, EOMI, PERRL  Chest Adeq E b/l, No wheezing  Heart RRR, No M/R/G  Abd- Soft, NT, BS+, no flank pain  -  + increased vaginal bleed  Extremity- Moving all extremities, No digital clubbing,   No edema  Neuro- Aox3,grossly non focal ,  gait not checked  Skin  Has no tattoo, No skin rash     Medical Decision Making       85 MINUTES SPENT BY ME on the date of service doing chart review, history, exam, documentation & further activities per the note.      ------------------ MEDICAL DECISION MAKING ------------------------------------------------------------------------------------------------------  MANAGEMENT DISCUSSED with the following over the past 24 hours: patient and care team       Data   ------------------------- PAST 24 HR DATA REVIEWED -----------------------------------------------    I have personally  reviewed the following data over the past 24 hrs:    6.6  \   9.6 (L)   / 169     139 102 10.9 /  105 (H)   3.7 22 0.80 \     ALT: 56 (H) AST: 40 AP: 145 TBILI: 0.6   ALB: 3.8 TOT PROTEIN: 7.2 LIPASE: N/A     Procal: 0.32 CRP: N/A Lactic Acid: 1.3         Imaging results reviewed over the past 24 hrs:   Recent Results (from the past 24 hour(s))   CT Abdomen Pelvis w Contrast    Narrative    EXAM: CT ABDOMEN PELVIS W CONTRAST  LOCATION: [facility]  DATE: 9/27/2024    INDICATION: Abdominal pain, increased vaginal bleeding, fever. Question endometritis.  COMPARISON: CT 4/5/2023  TECHNIQUE: CT scan of the abdomen and pelvis was performed following injection of IV contrast. Multiplanar reformats were obtained. Dose reduction techniques were used.  CONTRAST: Isovue 370 90ML    FINDINGS:   LOWER CHEST: Normal.    HEPATOBILIARY: Mild hepatomegaly. The liver, bile ducts, gallbladder, hepatic veins, and portal veins are otherwise normal.    PANCREAS: Normal.     SPLEEN: Splenomegaly with the spleen measuring 16.4 cm in greatest longitudinal dimension. On previous CT 4/5/2023 the spleen measured 12.7 cm.    ADRENAL GLANDS: Normal.    KIDNEYS/BLADDER: There is asymmetric dilatation seen involving the proximal and mid portions of the right ureter with transition site seen just below the pelvic brim adjacent to the right aspect of the uterus. No underlying stones or masses identified.   There is asymmetric increased uroepithelial enhancement involving the upper aspect of the right urinary tract collecting system when compared to left and changes of inflammation is suspected. A urinalysis would be of benefit. There is no evidence for a   urinary tract abscess. The bladder is normal in appearance.    BOWEL: Normal.    LYMPH NODES: Normal.    VASCULATURE: Normal.    PELVIC ORGANS: The endometrial stripe measures approximately 23.5 mm with some heterogenous density seen within the endometrium to include some  areas of gas. I cannot rule out changes of infection given patient's clinical history. No evidence for a   well-defined abscess identified.    MUSCULOSKELETAL: Mild osteopenia. Minimal hypertrophic changes in the spine.      Impression    IMPRESSION:   1.  Hepatosplenomegaly with no varicosities or ascites.    2.  Abnormal appearance of the endometrial canal which can be seen with changes of infection. No abscess identified.    3.  Changes worrisome for inflammation/infection involving the upper aspect of the right urinary tract collecting system. There is asymmetric dilatation of the upper right urinary tract collecting system compared to the left with transition site seen   adjacent to the right aspect of the uterus.

## 2024-09-28 NOTE — PROGRESS NOTES
Progress note    Patient feeling significantly better. Denies fevers or chills. Denies PIH symptoms. Bleeding improved.     Temp:  [98.1  F (36.7  C)-102.5  F (39.2  C)] 98.2  F (36.8  C)  Pulse:  [] 95  Resp:  [18-41] 18  BP: (102-191)/(55-84) 121/57  SpO2:  [92 %-100 %] 98 %    NAD, AAO x 3  Abdomen soft, non tender    Hgb: 8.3  Blood culture: E coli    A/P: 33 yo PPD #9 s/p readmission for likely acute pyelonephritis and possible endometritis.   -- Feeling better. Afebrile.   -- Acute blood loss anemia - Asymptomatic. Bleeding improved. Hgb stable. Continue to monitor.   -- Unclear source of fever. Blood cultures - 1 of 2 bottles with gram negative bacilli. Endometritis we continue IV antibiotics for 24-48 hrs after a fever. Recommend continuing antibiotics.     Katya Oakes MD, FACOG  P) [phone]

## 2024-09-28 NOTE — PLAN OF CARE
Tangela's vital and maternal assessment WDL, besides noted edema during this RN's care. Tangela denies headache, vision changes, right epigastric pain, chest pain and SOB. Bleeding has been rubra serosa to rubra alba in color, no noted clots and scant flow during this RN's care. Pain adequately managed with previously given Tylenol and Ibuprofen. Tolerating IV antibiotics well without discomfort and resting comfortably. Up independently, voiding without difficultly, maintaining ordered I's and O's.     Problem: Adult Inpatient Plan of Care  Goal: Plan of Care Review  Description: The Plan of Care Review/Shift note should be completed every shift.  The Outcome Evaluation is a brief statement about your assessment that the patient is improving, declining, or no change.  This information will be displayed automatically on your shift  note.  Outcome: Progressing  Flowsheets (Taken 9/28/2024 0305)  Plan of Care Reviewed With: patient  Overall Patient Progress: improving     Problem: Hypertension Acute  Goal: Blood Pressure Within Desired Range  Outcome: Progressing     Problem: Adult Inpatient Plan of Care  Goal: Optimal Comfort and Wellbeing  Intervention: Monitor Pain and Promote Comfort  Recent Flowsheet Documentation  Taken 9/28/2024 0003 by Evangelina Delcid, RN  Pain Management Interventions:   care clustered   quiet environment facilitated   rest     Problem: Infection  Goal: Absence of Infection Signs and Symptoms  Outcome: Progressing       Goal Outcome Evaluation:      Plan of Care Reviewed With: patient    Overall Patient Progress: improvingOverall Patient Progress: improving

## 2024-09-28 NOTE — PROGRESS NOTES
D:  Patient admitted to [facility]/[id number] via bed without  and support person.  A:  Telephone report received from Hanh LEON RN (ED RN). Oriented patient to surroundings; call light within reach.   R:  Patient tolerated transfer. Care assumed.

## 2024-09-28 NOTE — PLAN OF CARE
Problem: Hypertension Acute  Goal: Blood Pressure Within Desired Range  Outcome: Progressing     Problem: Infection  Goal: Absence of Infection Signs and Symptoms  Outcome: Progressing   Goal Outcome Evaluation:      Plan of Care Reviewed With: patient    Overall Patient Progress: improvingOverall Patient Progress: improving    Outcome Evaluation: Patient has remained afebrile during the day. Her blood pressure has been normotensive. She reports feeling much better that she did last night. Lochia has been scant to light flow. Plan to continue with antibiotics and reevaluate tomorrow.

## 2024-09-28 NOTE — MEDICATION SCRIBE - ADMISSION MEDICATION HISTORY
Medication Scribe Admission Medication History    Admission medication history is complete. The information provided in this note is only as accurate as the sources available at the time of the update.    Information Source(s): Patient and CareEverywhere/SureScripts via in-person    Pertinent Information:     Changes made to PTA medication list:  Added: None  Deleted: None  Changed: None    Allergies reviewed with patient and updates made in EHR: yes    Medication History Completed By: ROBB VERGARA 9/27/2024 8:00 PM    PTA Med List   Medication Sig Last Dose    acetaminophen (TYLENOL) 500 MG tablet Take 500-1,000 mg by mouth every 6 hours as needed. 9/27/2024 at am    FLUoxetine (PROZAC) 20 MG capsule Take 1 capsule by mouth daily. 9/27/2024 at am    ibuprofen (ADVIL/MOTRIN) 600 MG tablet Take 600 mg by mouth every 6 hours as needed. 9/27/2024 at noon

## 2024-09-28 NOTE — CONSULTS
CONSULTATION - GYN    NAME: Tangela Setward   : 1990   MRN: [id number]      ADMISSION DATE: 2024    PCP:  Tiera Ortiz have been requested by Dr. Flowers to evaluate Tangela Steward for fever, abdominal pain, elevated blood pressures postpartum.     CHIEF COMPLAINT: fever, abdominal pain, elevated blood pressures postpartum.     HPI:  Tangela Steward is a 34 year old  who is s/p  at [facility] on . Her labor course was complicated by GBS positive status, she did receive an epidural. The patient reports manual extraction of the placenta, but this is not documented in the notes.  History is obtained through the patient and chart review.   Patient states she has lower abdominal pain and back pain. Had increased bleeding today that was dark red. Denies laceration with delivery.  She is not breastfeeding.      PAST MEDICAL HISTORY:  Past Medical History:   Diagnosis Date    Depressive disorder     Uncomplicated asthma     seasonal asthma; inhaler used with overexertion       PAST SURGICAL HISTORY:  No past surgical history on file.    SOCIAL HISTORY:  Social History     Socioeconomic History    Marital status:      Spouse name: Not on file    Number of children: Not on file    Years of education: Not on file    Highest education level: Not on file   Occupational History    Not on file   Tobacco Use    Smoking status: Former    Smokeless tobacco: Never   Substance and Sexual Activity    Alcohol use: No    Drug use: No    Sexual activity: Not Currently     Partners: Male   Other Topics Concern    Not on file   Social History Narrative    Not on file     Social Determinants of Health     Financial Resource Strain: Not on file   Food Insecurity: No Food Insecurity (2024)    [facility]    Hunger Vital Sign     Worried About Running Out of Food in the Last Year: Never true     Ran Out of Food in the Last Year: Never true   Transportation  Needs: No Transportation Needs (9/19/2024)    Received from [employer]    PRAPARE - Transportation     Lack of Transportation (Medical): No     Lack of Transportation (Non-Medical): No   Physical Activity: Not on file   Stress: Not on file   Social Connections: Not on file   Interpersonal Safety: Not At Risk (9/19/2024)    Received from [employer]    Humiliation, Afraid, Rape, and Kick questionnaire     Fear of Current or Ex-Partner: No     Emotionally Abused: No     Physically Abused: No     Sexually Abused: No   Housing Stability: Low Risk  (9/19/2024)    Received from [employer]    Housing Stability Vital Sign     Unable to Pay for Housing in the Last Year: No     Number of Times Moved in the Last Year: 1     Homeless in the Last Year: No       MEDICATIONS:  Current Facility-Administered Medications   Medication Dose Route Frequency Provider Last Rate Last Admin    ampicillin-sulbactam (UNASYN) 3 g vial to attach to  mL bag  3 g Intravenous Once Gloria Pastrana MD        hydrALAZINE (APRESOLINE) injection 5-10 mg  5-10 mg Intravenous Q20 Min PRN Gloria Pastrana MD        labetalol (NORMODYNE/TRANDATE) injection 20-40 mg  20-40 mg Intravenous Q10 Min PRN Gloria Pastrana MD        lactated ringers infusion   mL/hr Intravenous Continuous Gloria Pastrana MD        lidocaine (LMX4) cream   Topical Q1H PRN Gloria Pastrana MD        lidocaine 1 % 0.1-1 mL  0.1-1 mL Other Q1H PRN Gloria Pastrana MD        sodium chloride (PF) 0.9% PF flush 3 mL  3 mL Intracatheter Q8H Gloria Pastrana MD        sodium chloride (PF) 0.9% PF flush 3 mL  3 mL Intracatheter q1 min prn Gloria Pastrana MD         Current Outpatient Medications   Medication Sig Dispense Refill    Prenatal Vit-Fe Fumarate-FA (PNV PRENATAL PLUS MULTIVITAMIN) 27-1 MG TABS per tablet Take 1 tablet by mouth daily. 2 gummies daily         ALLERGIES:  Allergies   Allergen Reactions     Bupropion Other (See Comments)     suicidality  suicidality  suicidality      Hydrocodone-Acetaminophen Nausea and Vomiting     Not allergic to acetaminophen    Buspirone Nausea     Nausea, but significant       REVIEW OF SYSTEMS    Negative except what is stated in the HPI    PHYSICAL EXAM:  /65   Pulse 85   Temp (!) 100.5  F (38.1  C) (Oral)   Resp 19   LMP 12/16/2023   SpO2 97%   Breastfeeding Unknown    General Appearance: Alert, appropriate appearance for age. No acute distress  HEENT : Grossly normal  Chest/Respiratory: Normal chest wall and respirations.   Cardiovascular: Regular rate and rhythm   Gastrointestinal: Mild tenderness in lower abdomen  Pelvic Exam Female:  deferred,   Psychiatric: Alert and oriented, appropriate affect.    LABS  Recent Results (from the past 24 hour(s))   Lactic acid whole blood    Collection Time: 09/27/24  7:10 PM   Result Value Ref Range    Lactic Acid 1.3 0.7 - 2.0 mmol/L   CBC with platelets and differential    Collection Time: 09/27/24  7:10 PM   Result Value Ref Range    WBC Count 6.6 4.0 - 11.0 10e3/uL    RBC Count 3.69 (L) 3.80 - 5.20 10e6/uL    Hemoglobin 9.6 (L) 11.7 - 15.7 g/dL    Hematocrit 31.6 (L) 35.0 - 47.0 %    MCV 86 78 - 100 fL    MCH 26.0 (L) 26.5 - 33.0 pg    MCHC 30.4 (L) 31.5 - 36.5 g/dL    RDW 17.9 (H) 10.0 - 15.0 %    Platelet Count 169 150 - 450 10e3/uL    % Neutrophils 87 %    % Lymphocytes 5 %    % Monocytes 6 %    % Eosinophils 0 %    % Basophils 0 %    % Immature Granulocytes 1 %    NRBCs per 100 WBC 0 <1 /100    Absolute Neutrophils 5.7 1.6 - 8.3 10e3/uL    Absolute Lymphocytes 0.3 (L) 0.8 - 5.3 10e3/uL    Absolute Monocytes 0.4 0.0 - 1.3 10e3/uL    Absolute Eosinophils 0.0 0.0 - 0.7 10e3/uL    Absolute Basophils 0.0 0.0 - 0.2 10e3/uL    Absolute Immature Granulocytes 0.1 <=0.4 10e3/uL    Absolute NRBCs 0.0 10e3/uL       Lab Results: personally reviewed.     IMAGING:  No results found for this visit on 09/27/24.    Radiology Results:  Personally reviewed impression/s    IMPRESSION:  34 year old  Female,  s/p   at [place] who presents with fever, abdominal pain, back pain with CT suggestive of pyelonephritis, possible endometritis, and elevated blood pressures (now resolved).    RECOMMENDATIONS:  -Given timing - more likely pyelonephritis than endometritis, but can try to treat for both. Endometritis typically treated with ampicillin, gentamicin and clindamycin together or Unasyn.   -No evidence of pre-eclampsia on labs, blood pressure normalized, can continue to monitor blood pressures with goal of <140/90s.       Thank you for allowing us to participate in the care of this patient.  Please contact us with any questions/concerns.    Jayne Jackson MD     CC: Tiera Ortiz

## 2024-09-28 NOTE — PHARMACY-VANCOMYCIN DOSING SERVICE
"Pharmacy Vancomycin Initial Note  Date of Service 2024  Patient's  1990  34 year old, female    Indication: Sepsis    Current estimated CrCl = Estimated Creatinine Clearance: 116.8 mL/min (based on SCr of 0.8 mg/dL).    Creatinine for last 3 days  2024:  7:10 PM Creatinine 0.80 mg/dL    Recent Vancomycin Level(s) for last 3 days  No results found for requested labs within last 3 days.      Vancomycin IV Administrations (past 72 hours)                     vancomycin (VANCOCIN) 2,500 mg in sodium chloride 0.9 % 500 mL intermittent infusion (mg) 2,500 mg New Bag 24 0140                    Nephrotoxins and other renal medications (From now, onward)      Start     Dose/Rate Route Frequency Ordered Stop    24 0400  piperacillin-tazobactam (ZOSYN) 3.375 g vial to attach to  mL bag        Note to Pharmacy: For [facility], [facility] and [facility]: For Zosyn-naive patients, use the \"Zosyn initial dose + extended infusion\" order panel.    3.375 g  over 240 Minutes Intravenous EVERY 8 HOURS 24 0007      24 0100  vancomycin (VANCOCIN) 2,500 mg in sodium chloride 0.9 % 500 mL intermittent infusion         2,500 mg  over 2 Hours Intravenous ONCE 24 0031              Contrast Orders - past 72 hours (72h ago, onward)      Start     Dose/Rate Route Frequency Stop    24  iopamidol (ISOVUE-370) solution 90 mL         90 mL Intravenous ONCE 24 2019            [employer] Prediction of Planned Initial Vancomycin Regimen  Loading dose: 2500 mg on 24 at 01:40  Regimen: 1000 mg IV every 12 hours.  Start time: 14:00 on 2024  Exposure target: AUC24 (range)400-600 mg/L.hr   AUC24,ss: 457 mg/L.hr  Probability of AUC24 > 400: 60 %  Ctrough,ss: 12.7 mg/L  Probability of Ctrough,ss > 20: 28 %  Probability of nephrotoxicity (Lodise KARMEN ): 8 %          Plan:  Start vancomycin 2500 mg loading dose (given at 01:40). Follow with vancomycin 1000 mg IV q12h.   Vancomycin monitoring " method: AUC  Vancomycin therapeutic monitoring goal: 400-600 mg*h/L  Pharmacy will check vancomycin levels as appropriate in 1-3 Days.    Serum creatinine levels will be ordered daily for the first week of therapy and at least twice weekly for subsequent weeks.      Sharon Shelley [facility]

## 2024-09-29 VITALS
DIASTOLIC BLOOD PRESSURE: 72 MMHG | OXYGEN SATURATION: 97 % | WEIGHT: 235.6 LBS | HEART RATE: 81 BPM | RESPIRATION RATE: 16 BRPM | BODY MASS INDEX: 46.26 KG/M2 | HEIGHT: 60 IN | SYSTOLIC BLOOD PRESSURE: 138 MMHG | TEMPERATURE: 98.4 F

## 2024-09-29 PROBLEM — O13.9 GESTATIONAL HYPERTENSION: Status: ACTIVE | Noted: 2024-09-29

## 2024-09-29 LAB
BACTERIA UR CULT: ABNORMAL
CREAT SERPL-MCNC: 0.74 MG/DL (ref 0.51–0.95)
EGFRCR SERPLBLD CKD-EPI 2021: >90 ML/MIN/1.73M2
ERYTHROCYTE [DISTWIDTH] IN BLOOD BY AUTOMATED COUNT: 18.2 % (ref 10–15)
HCT VFR BLD AUTO: 27.1 % (ref 35–47)
HGB BLD-MCNC: 8 G/DL (ref 11.7–15.7)
MCH RBC QN AUTO: 26 PG (ref 26.5–33)
MCHC RBC AUTO-ENTMCNC: 29.5 G/DL (ref 31.5–36.5)
MCV RBC AUTO: 88 FL (ref 78–100)
PLATELET # BLD AUTO: 168 10E3/UL (ref 150–450)
RBC # BLD AUTO: 3.08 10E6/UL (ref 3.8–5.2)
WBC # BLD AUTO: 4.8 10E3/UL (ref 4–11)

## 2024-09-29 PROCEDURE — 82565 ASSAY OF CREATININE: CPT | Performed by: OBSTETRICS & GYNECOLOGY

## 2024-09-29 PROCEDURE — 250N000011 HC RX IP 250 OP 636: Performed by: INTERNAL MEDICINE

## 2024-09-29 PROCEDURE — 36415 COLL VENOUS BLD VENIPUNCTURE: CPT | Performed by: OBSTETRICS & GYNECOLOGY

## 2024-09-29 PROCEDURE — 85014 HEMATOCRIT: CPT | Performed by: HOSPITALIST

## 2024-09-29 PROCEDURE — 250N000013 HC RX MED GY IP 250 OP 250 PS 637: Performed by: OBSTETRICS & GYNECOLOGY

## 2024-09-29 PROCEDURE — 250N000013 HC RX MED GY IP 250 OP 250 PS 637: Performed by: HOSPITALIST

## 2024-09-29 PROCEDURE — 99239 HOSP IP/OBS DSCHRG MGMT >30: CPT | Performed by: HOSPITALIST

## 2024-09-29 RX ORDER — AMPICILLIN TRIHYDRATE 500 MG
500 CAPSULE ORAL 4 TIMES DAILY
Qty: 40 CAPSULE | Refills: 0 | Status: SHIPPED | OUTPATIENT
Start: 2024-09-29

## 2024-09-29 RX ORDER — LABETALOL 100 MG/1
100 TABLET, FILM COATED ORAL 2 TIMES DAILY
Qty: 30 TABLET | Refills: 1 | Status: SHIPPED | OUTPATIENT
Start: 2024-09-29

## 2024-09-29 RX ORDER — AMPICILLIN TRIHYDRATE 500 MG
500 CAPSULE ORAL 4 TIMES DAILY
Qty: 40 CAPSULE | Refills: 0 | Status: SHIPPED | OUTPATIENT
Start: 2024-09-29 | End: 2024-09-29

## 2024-09-29 RX ADMIN — ACETAMINOPHEN 650 MG: 325 TABLET ORAL at 02:51

## 2024-09-29 RX ADMIN — FLUOXETINE HYDROCHLORIDE 20 MG: 20 CAPSULE ORAL at 10:00

## 2024-09-29 RX ADMIN — ACETAMINOPHEN 650 MG: 325 TABLET ORAL at 10:46

## 2024-09-29 RX ADMIN — FAMOTIDINE 20 MG: 10 INJECTION, SOLUTION INTRAVENOUS at 00:37

## 2024-09-29 RX ADMIN — PIPERACILLIN AND TAZOBACTAM 3.38 G: 3; .375 INJECTION, POWDER, FOR SOLUTION INTRAVENOUS at 12:01

## 2024-09-29 RX ADMIN — PIPERACILLIN AND TAZOBACTAM 3.38 G: 3; .375 INJECTION, POWDER, FOR SOLUTION INTRAVENOUS at 04:12

## 2024-09-29 ASSESSMENT — ACTIVITIES OF DAILY LIVING (ADL)
ADLS_ACUITY_SCORE: 18

## 2024-09-29 NOTE — PROGRESS NOTES
D:  Patient desires discharge home.  Discharge orders received and entered by provider.  A:  Discharge instructions reviewed with the patient.  All questions and concerns addressed.  R:  Discharge criteria met.   The nurse escorted patient to hospital lobby .

## 2024-09-29 NOTE — PLAN OF CARE
"Goal Outcome Evaluation:      Plan of Care Reviewed With: patient    Overall Patient Progress: improvingOverall Patient Progress: improving       Problem: Adult Inpatient Plan of Care  Goal: Plan of Care Review  Outcome: Adequate for Care Transition  Flowsheets (Taken 9/29/2024 1707)  Plan of Care Reviewed With: patient  Overall Patient Progress: improving     Problem: Hypertension Acute  Goal: Blood Pressure Within Desired Range  Outcome: Adequate for Care Transition    Patient meets postpartum criteria to be discharged. Independent in her own cares. Educated on prescriptions, warning signs, and follow-up appointment. Received blood pressure cuff, magnet, and pamphlet. Educated on how to take BP and warning signs. Reviewed paperwork \"Checking your Blood Pressure at Home\" and \"Know your Blood Pressure\". Follow-up scheduled for Wednesday, 10/2. All questions and concerns answered at this time.   "

## 2024-09-29 NOTE — PLAN OF CARE
Goal Outcome Evaluation:      Plan of Care Reviewed With: patient    Overall Patient Progress: improvingOverall Patient Progress: improving       Pt remains afebrile. Vitals q2hrs. Dr Jackson would like to continue to assess bp's and will plan to discharge pt to home this evening.

## 2024-09-29 NOTE — PLAN OF CARE
Patient's vitals within normal parameter.     /67 (BP Location: Left arm, Patient Position: Semi-Fleming's, Cuff Size: Adult Large)   Pulse 83   Temp 98.4  F (36.9  C) (Oral)   Resp 16   Ht 1.524 m (5')   Wt 106.4 kg (234 lb 8 oz)   LMP 2023   SpO2 96%   Breastfeeding Unknown   BMI 45.80 kg/m      Lochia has been scant.     Monitoring I/Os and daily weight.     Tolerating IV antibiotics.     Reports mild 2/10 headache, declined medication intervention. Denies visual changes, new/worsened swelling, epigastric pain. Dr. Goddard aware.     Patient reports feeling improved. Her significant other and  are at the bedside.   Patient has been resting between assessments.       Update: Patient reported 6/10 headache. Patient requested and received po prn Tylenol, with good effect. Patient now reports she does not have any pain.    Goal Outcome Evaluation:      Plan of Care Reviewed With: patient    Overall Patient Progress: improvingOverall Patient Progress: improving         Carly Neely RN

## 2024-09-29 NOTE — DISCHARGE SUMMARY
[facility] MEDICINE  DISCHARGE SUMMARY     Primary Care Physician: Tiera Ortiz  Admission Date: 2024   Discharge Provider: Esperanza Avery MD Discharge Date: 2024   Diet:   Active Diet and Nourishment Order   Procedures    Regular Diet Adult    Diet       Code Status: Full Code   Activity: DCACTIVITY: Activity as tolerated        Condition at Discharge: Stable     REASON FOR PRESENTATION(See Admission Note for Details)     Abdominal pain    PRINCIPAL & ACTIVE DISCHARGE DIAGNOSES     Active Problems:    Pyelonephritis, acute    Endometritis following delivery      PENDING LABS     Unresulted Labs Ordered in the Past 30 Days of this Admission       Date and Time Order Name Status Description    2024  1:23 PM Blood Culture Arm, Left Preliminary     2024  8:15 PM Urine Culture Preliminary     2024  6:55 PM Blood Culture Line, venous Preliminary               PROCEDURES ( this hospitalization only)          RECOMMENDATIONS TO OUTPATIENT PROVIDER FOR F/U VISIT     Follow-up Appointments     Follow-up and recommended labs and tests       Follow up with primary care provider, Tiera Ortiz, within 7 days   for hospital follow- up.  No follow up labs or test are needed.    Follow up with OBGYN for routine postpartum follow up              DISPOSITION     Home    SUMMARY OF HOSPITAL COURSE:      Tangela is a 34-year-old postpartum, status post  on  presented here with chief complaints of fever, abdominal pain.  Workup revealed sepsis secondary to acute endometritis and pyelonephritis.     Acute pyelonephritis  -Ct abdomen reviewed   -Urine culture e.coli  -Blood culture + for E.coli  -Stopped vancomycin. On Zosyn here --> transitioned to ampicillin on discharge for X 10 days     Acute endometritis/Postpartum endometritis  -CT abdomen reviewed,  -Pt s/p  on  with manual extraction of placenta  -On Zosyn here--> switched to ampicillin  "     ABL anemia  -Hb so far stable, monitor closely  -Transfuse if Hb <7  -Continue PNV, iron supplements     Depression-Continue PTA fluoxetine.  -Close follow up with PCP and OBGYN out-pt    .Patient stable to be discharged home today. Please refer to discharge medications and instructions for more details.      Discharge Medications with Med changes:     Current Discharge Medication List        START taking these medications    Details   ampicillin (PRINCIPEN) 500 MG capsule Take 1 capsule (500 mg) by mouth 4 times daily for 10 days.  Qty: 40 capsule, Refills: 0    Associated Diagnoses: Pyelonephritis, acute; Endometritis following delivery           CONTINUE these medications which have NOT CHANGED    Details   acetaminophen (TYLENOL) 500 MG tablet Take 500-1,000 mg by mouth every 6 hours as needed.      FLUoxetine (PROZAC) 20 MG capsule Take 1 capsule by mouth daily.      ibuprofen (ADVIL/MOTRIN) 600 MG tablet Take 600 mg by mouth every 6 hours as needed.                   Rationale for medication changes:      See med rec        Consults       OB GYN IP CONSULT  PHARMACY TO DOSE VANCO    Immunizations given this encounter     Most Recent Immunizations   Administered Date(s) Administered    COVID-19 Monovalent 18+ (Moderna) 06/04/2021           Anticoagulation Information      Recent INR results: No results for input(s): \"INR\" in the last 168 hours.  Warfarin doses (if applicable) or name of other anticoagulant: N/A      SIGNIFICANT IMAGING FINDINGS     Results for orders placed or performed during the hospital encounter of 09/27/24   CT Abdomen Pelvis w Contrast    Impression    IMPRESSION:   1.  Hepatosplenomegaly with no varicosities or ascites.    2.  Abnormal appearance of the endometrial canal which can be seen with changes of infection. No abscess identified.    3.  Changes worrisome for inflammation/infection involving the upper aspect of the right urinary tract collecting system. There is asymmetric " dilatation of the upper right urinary tract collecting system compared to the left with transition site seen   adjacent to the right aspect of the uterus.       SIGNIFICANT LABORATORY FINDINGS       Discharge Orders        Reason for your hospital stay    Acute pyelonephritis, postpartum endometritis     Follow-up and recommended labs and tests     Follow up with primary care provider, Tiera Ortiz, within 7 days for hospital follow- up.  No follow up labs or test are needed.    Follow up with OBGYN for routine postpartum follow up     Activity    Your activity upon discharge: activity as tolerated     Diet    Follow this diet upon discharge: Current Diet:Orders Placed This Encounter      Regular Diet Adult       Examination   Physical Exam   Temp:  [98.1  F (36.7  C)-98.5  F (36.9  C)] 98.1  F (36.7  C)  Pulse:  [80-93] 84  Resp:  [16-18] 16  BP: (115-150)/(50-78) 150/78  SpO2:  [96 %-98 %] 96 %  Wt Readings from Last 1 Encounters:   09/29/24 106.9 kg (235 lb 9.6 oz)       General: Pleasant, NAD  HEENT:EOMI, AT,NC  RS:Non labored breathing  Neurology:Grossly normal  Psy:Approrpiate affect      Please see EMR for more detailed significant labs, imaging, consultant notes etc.    IEsperanza MD, personally saw the patient today and spent greater than 30 minutes discharging this patient.    Esperanza Avery MD  [facility]    CC:Tiera Ortiz

## 2024-09-29 NOTE — PROGRESS NOTES
Obstetrics Post-Partum Progress Note          Assessment and Plan:    aTngela Steward is a 34 year old  s/p Normal spontaneous vaginal delivery  readmitted for pyelonephritis with positive blood culture for E. Coli, elevated blood pressures, possible endometritis     L&D complications: Elevated BP- postpartum hypertension with normal labs. Labile with some BP being low normal and now elevated in mild range this morning.    Endometritis s/p 24 hours IV antibiotics from fever. This would complete the typical course of antibiotics if only endometritis    Sepsis, pyelo and positive blood culture being managed by hospitalist. Discussed with Dr. Avery this morning, likely discharge on ampicillin monotherapy.    Iron deficiency anemia      Doing well.      Plan:   Pain control measures as needed  IV Iron offered for anemia with hemoglobin 8 - declines and will do prenatal with iron at home  Transition to ampicillin per hospitalist team  Monitor blood pressures, home cuff teaching, and may need antihypertensives pending trends of blood pressure. This is sent to her pharmacy in the case she needs this.  Discharge later today based on blood pressures           Interval History:   Patient is doing well, wants to go home today. Wants to transfer care to our clinic and needs tubal ligation. She denies symptoms of anemia, she declines iron infusion today and will take a prenatal vitamin at home. No swelling.          Physical Exam:   Patient Vitals for the past 24 hrs:   BP Temp Temp src Pulse Resp SpO2 Weight   24 0836 (!) 150/78 -- -- -- -- -- --   24 0821 (!) 145/70 -- -- -- -- -- --   24 0750 (!) 140/70 98.1  F (36.7  C) Oral 84 16 96 % --   24 0450 -- -- -- -- -- -- 106.9 kg (235 lb 9.6 oz)   24 0409 132/70 98.1  F (36.7  C) Oral 80 16 96 % --   24 0031 131/67 98.4  F (36.9  C) Oral 83 16 96 % --   24 2000 115/50 98.1  F (36.7  C) Oral 93 18 97 % --   24  1623 119/59 98.5  F (36.9  C) Oral -- 18 98 % --   09/28/24 1210 121/57 98.2  F (36.8  C) Oral -- 18 -- --   09/28/24 1156 -- -- -- -- -- 98 % --   09/28/24 1151 -- -- -- -- -- 96 % --     Uterine fundus is firm, non-tender       Data:     Recent Results (from the past 24 hour(s))   Hemoglobin    Collection Time: 09/28/24  9:36 AM   Result Value Ref Range    Hemoglobin 8.3 (L) 11.7 - 15.7 g/dL   Blood Culture Arm, Left    Collection Time: 09/28/24  1:50 PM    Specimen: Arm, Left; Blood   Result Value Ref Range    Culture No growth after 12 hours    Creatinine    Collection Time: 09/29/24  6:06 AM   Result Value Ref Range    Creatinine 0.74 0.51 - 0.95 mg/dL    GFR Estimate >90 >60 mL/min/1.73m2   CBC with platelets    Collection Time: 09/29/24  6:06 AM   Result Value Ref Range    WBC Count 4.8 4.0 - 11.0 10e3/uL    RBC Count 3.08 (L) 3.80 - 5.20 10e6/uL    Hemoglobin 8.0 (L) 11.7 - 15.7 g/dL    Hematocrit 27.1 (L) 35.0 - 47.0 %    MCV 88 78 - 100 fL    MCH 26.0 (L) 26.5 - 33.0 pg    MCHC 29.5 (L) 31.5 - 36.5 g/dL    RDW 18.2 (H) 10.0 - 15.0 %    Platelet Count 168 150 - 450 10e3/uL     Recent Labs   Lab Test 09/28/24  0038   AS Negative     Recent Labs   Lab Test 09/29/24  0606 09/28/24  0936   HGB 8.0* 8.3*     No lab results found.    Jayne Jcakson MD

## 2024-09-29 NOTE — PROGRESS NOTES
0744: Updated Dr Jackson hemoglobin is 8 at 0606. No new orders.     0835: Updated Dr Jackson on last two elevated bps. Dr Jackson okay for pt to be saline locked once IV antibiotic complete.     1103: Updated Dr Jackson bp 139/84 at 1040. Would like to continue to checked bp's q2hrs. Would like pt to get her next dose of IV zosyn as scheduled. Will plan to discharge pt home this afternoon 5pm.

## 2024-09-30 ENCOUNTER — PATIENT OUTREACH (OUTPATIENT)
Dept: CARE COORDINATION | Facility: CLINIC | Age: 34
End: 2024-09-30
Payer: COMMERCIAL

## 2024-09-30 LAB
BACTERIA BLD CULT: ABNORMAL
BACTERIA BLD CULT: ABNORMAL

## 2024-09-30 NOTE — PROGRESS NOTES
"  [facility]: Transitions of Care Outreach  Chief Complaint   Patient presents with    Clinic Care Coordination - Post Hospital       Most Recent Admission Date: 9/27/2024   Most Recent Admission Diagnosis: Pyelonephritis, acute - N10  Endometritis following delivery - O86.12     Most Recent Discharge Date: 9/29/2024   Most Recent Discharge Diagnosis: Pyelonephritis, acute - N10  Endometritis following delivery - O86.12  Recurrent major depression in partial remission (H) - F33.41  Elevated blood pressure reading without diagnosis of hypertension - R03.0  Encounter for triage in pregnant patient - Z36.89     Transitions of Care Assessment    Discharge Assessment  How are you doing now that you are home?: \" I feel great \"  How are your symptoms? (Red Flag symptoms escalate to triage hotline per guidelines): Improved  Do you know how to contact your clinic care team if you have future questions or changes to your health status? : Yes  Does the patient have their discharge instructions? : Yes  Does the patient have questions regarding their discharge instructions? : No  Were you started on any new medications or were there changes to any of your previous medications? : Yes  Does the patient have all of their medications?: Yes  Do you have questions regarding any of your medications? : No  Do you have all of your needed medical supplies or equipment (DME)?  (i.e. oxygen tank, CPAP, cane, etc.): Yes    Post-op ([facility] Only)  If the patient had a surgery or procedure, do they have any questions for a nurse?: No             Follow up Plan     Discharge Follow-Up  Discharge follow up appointment scheduled in alignment with recommended follow up timeframe or Transitions of Risk Category? (Low = within 30 days; Moderate= within 14 days; High= within 7 days): Yes  Discharge Follow Up Appointment Date: 10/02/24  Discharge Follow Up Appointment Scheduled with?: Specialty Care Provider    No future " appointments.    Outpatient Plan as outlined on AVS reviewed with patient.    For any urgent concerns, please contact our 24 hour nurse triage line: [phone] ([phone])       ORLY Lopez

## 2024-10-03 LAB — BACTERIA BLD CULT: NO GROWTH

## 2024-10-30 ENCOUNTER — HOSPITAL ENCOUNTER (INPATIENT)
Facility: HOSPITAL | Age: 34
LOS: 1 days | Discharge: HOME | DRG: 769 | End: 2024-10-31
Attending: EMERGENCY MEDICINE | Admitting: HOSPITALIST
Payer: COMMERCIAL

## 2024-10-30 ENCOUNTER — APPOINTMENT (OUTPATIENT)
Dept: CT IMAGING | Facility: HOSPITAL | Age: 34
DRG: 769 | End: 2024-10-30
Payer: COMMERCIAL

## 2024-10-30 ENCOUNTER — OFFICE VISIT (OUTPATIENT)
Dept: URGENT CARE | Facility: URGENT CARE | Age: 34
End: 2024-10-30
Payer: COMMERCIAL

## 2024-10-30 ENCOUNTER — APPOINTMENT (OUTPATIENT)
Dept: ULTRASOUND IMAGING | Facility: HOSPITAL | Age: 34
DRG: 769 | End: 2024-10-30
Payer: COMMERCIAL

## 2024-10-30 VITALS
DIASTOLIC BLOOD PRESSURE: 78 MMHG | RESPIRATION RATE: 20 BRPM | SYSTOLIC BLOOD PRESSURE: 132 MMHG | TEMPERATURE: 99.2 F | HEART RATE: 88 BPM | OXYGEN SATURATION: 98 %

## 2024-10-30 DIAGNOSIS — R10.84 ABDOMINAL PAIN, GENERALIZED: Primary | ICD-10-CM

## 2024-10-30 DIAGNOSIS — K81.0 ACUTE CHOLECYSTITIS: ICD-10-CM

## 2024-10-30 PROBLEM — K76.0 FATTY LIVER: Status: ACTIVE | Noted: 2024-10-30

## 2024-10-30 PROBLEM — F33.1 MODERATE RECURRENT MAJOR DEPRESSION (H): Status: ACTIVE | Noted: 2024-10-02

## 2024-10-30 LAB
ALBUMIN SERPL BCG-MCNC: 4.1 G/DL (ref 3.5–5.2)
ALP SERPL-CCNC: 127 U/L (ref 40–150)
ALT SERPL W P-5'-P-CCNC: 60 U/L (ref 0–50)
ANION GAP SERPL CALCULATED.3IONS-SCNC: 13 MMOL/L (ref 7–15)
AST SERPL W P-5'-P-CCNC: 34 U/L (ref 0–45)
BASOPHILS # BLD AUTO: 0 10E3/UL (ref 0–0.2)
BASOPHILS NFR BLD AUTO: 0 %
BILIRUB DIRECT SERPL-MCNC: <0.2 MG/DL (ref 0–0.3)
BILIRUB SERPL-MCNC: 0.5 MG/DL
BUN SERPL-MCNC: 4.7 MG/DL (ref 6–20)
CALCIUM SERPL-MCNC: 8.5 MG/DL (ref 8.8–10.4)
CHLORIDE SERPL-SCNC: 106 MMOL/L (ref 98–107)
CREAT SERPL-MCNC: 0.68 MG/DL (ref 0.51–0.95)
EGFRCR SERPLBLD CKD-EPI 2021: >90 ML/MIN/1.73M2
EOSINOPHIL # BLD AUTO: 0.3 10E3/UL (ref 0–0.7)
EOSINOPHIL NFR BLD AUTO: 3 %
ERYTHROCYTE [DISTWIDTH] IN BLOOD BY AUTOMATED COUNT: 18.8 % (ref 10–15)
GLUCOSE SERPL-MCNC: 89 MG/DL (ref 70–99)
HCG SERPL QL: NEGATIVE
HCO3 SERPL-SCNC: 22 MMOL/L (ref 22–29)
HCT VFR BLD AUTO: 37.3 % (ref 35–47)
HGB BLD-MCNC: 11.3 G/DL (ref 11.7–15.7)
HOLD SPECIMEN: NORMAL
IMM GRANULOCYTES # BLD: 0.1 10E3/UL
IMM GRANULOCYTES NFR BLD: 1 %
LIPASE SERPL-CCNC: 14 U/L (ref 13–60)
LYMPHOCYTES # BLD AUTO: 1.4 10E3/UL (ref 0.8–5.3)
LYMPHOCYTES NFR BLD AUTO: 15 %
MCH RBC QN AUTO: 25.6 PG (ref 26.5–33)
MCHC RBC AUTO-ENTMCNC: 30.3 G/DL (ref 31.5–36.5)
MCV RBC AUTO: 85 FL (ref 78–100)
MONOCYTES # BLD AUTO: 0.5 10E3/UL (ref 0–1.3)
MONOCYTES NFR BLD AUTO: 6 %
NEUTROPHILS # BLD AUTO: 7 10E3/UL (ref 1.6–8.3)
NEUTROPHILS NFR BLD AUTO: 75 %
NRBC # BLD AUTO: 0 10E3/UL
NRBC BLD AUTO-RTO: 0 /100
PLATELET # BLD AUTO: 234 10E3/UL (ref 150–450)
POTASSIUM SERPL-SCNC: 3.5 MMOL/L (ref 3.4–5.3)
PROT SERPL-MCNC: 7 G/DL (ref 6.4–8.3)
RBC # BLD AUTO: 4.41 10E6/UL (ref 3.8–5.2)
SODIUM SERPL-SCNC: 141 MMOL/L (ref 135–145)
TROPONIN T SERPL HS-MCNC: <6 NG/L
WBC # BLD AUTO: 9.3 10E3/UL (ref 4–11)

## 2024-10-30 PROCEDURE — 120N000001 HC R&B MED SURG/OB

## 2024-10-30 PROCEDURE — 82435 ASSAY OF BLOOD CHLORIDE: CPT | Performed by: FAMILY MEDICINE

## 2024-10-30 PROCEDURE — 258N000003 HC RX IP 258 OP 636

## 2024-10-30 PROCEDURE — 99285 EMERGENCY DEPT VISIT HI MDM: CPT | Mod: 25

## 2024-10-30 PROCEDURE — 83690 ASSAY OF LIPASE: CPT | Performed by: FAMILY MEDICINE

## 2024-10-30 PROCEDURE — 250N000013 HC RX MED GY IP 250 OP 250 PS 637: Performed by: HOSPITALIST

## 2024-10-30 PROCEDURE — 85004 AUTOMATED DIFF WBC COUNT: CPT | Performed by: FAMILY MEDICINE

## 2024-10-30 PROCEDURE — 93005 ELECTROCARDIOGRAM TRACING: CPT

## 2024-10-30 PROCEDURE — 99203 OFFICE O/P NEW LOW 30 MIN: CPT

## 2024-10-30 PROCEDURE — 84484 ASSAY OF TROPONIN QUANT: CPT

## 2024-10-30 PROCEDURE — 82248 BILIRUBIN DIRECT: CPT | Performed by: FAMILY MEDICINE

## 2024-10-30 PROCEDURE — 96375 TX/PRO/DX INJ NEW DRUG ADDON: CPT

## 2024-10-30 PROCEDURE — 99222 1ST HOSP IP/OBS MODERATE 55: CPT | Performed by: HOSPITALIST

## 2024-10-30 PROCEDURE — 258N000003 HC RX IP 258 OP 636: Performed by: HOSPITALIST

## 2024-10-30 PROCEDURE — 76705 ECHO EXAM OF ABDOMEN: CPT

## 2024-10-30 PROCEDURE — G0378 HOSPITAL OBSERVATION PER HR: HCPCS

## 2024-10-30 PROCEDURE — 96365 THER/PROPH/DIAG IV INF INIT: CPT

## 2024-10-30 PROCEDURE — 250N000011 HC RX IP 250 OP 636

## 2024-10-30 PROCEDURE — 74177 CT ABD & PELVIS W/CONTRAST: CPT

## 2024-10-30 PROCEDURE — 36415 COLL VENOUS BLD VENIPUNCTURE: CPT | Performed by: FAMILY MEDICINE

## 2024-10-30 PROCEDURE — 84703 CHORIONIC GONADOTROPIN ASSAY: CPT

## 2024-10-30 RX ORDER — NALOXONE HYDROCHLORIDE 0.4 MG/ML
0.2 INJECTION, SOLUTION INTRAMUSCULAR; INTRAVENOUS; SUBCUTANEOUS
Status: DISCONTINUED | OUTPATIENT
Start: 2024-10-30 | End: 2024-10-31 | Stop reason: HOSPADM

## 2024-10-30 RX ORDER — OXYCODONE HYDROCHLORIDE 5 MG/1
5 TABLET ORAL EVERY 4 HOURS PRN
Status: DISCONTINUED | OUTPATIENT
Start: 2024-10-30 | End: 2024-10-31 | Stop reason: HOSPADM

## 2024-10-30 RX ORDER — AMOXICILLIN 250 MG
1 CAPSULE ORAL 2 TIMES DAILY PRN
Status: DISCONTINUED | OUTPATIENT
Start: 2024-10-30 | End: 2024-10-31 | Stop reason: HOSPADM

## 2024-10-30 RX ORDER — POLYETHYLENE GLYCOL 3350 17 G/17G
17 POWDER, FOR SOLUTION ORAL 2 TIMES DAILY PRN
Status: DISCONTINUED | OUTPATIENT
Start: 2024-10-30 | End: 2024-10-31 | Stop reason: HOSPADM

## 2024-10-30 RX ORDER — DOCUSATE SODIUM 100 MG/1
100 CAPSULE, LIQUID FILLED ORAL 2 TIMES DAILY
Status: DISCONTINUED | OUTPATIENT
Start: 2024-10-30 | End: 2024-10-31 | Stop reason: HOSPADM

## 2024-10-30 RX ORDER — HYDROMORPHONE HYDROCHLORIDE 1 MG/ML
0.4 INJECTION, SOLUTION INTRAMUSCULAR; INTRAVENOUS; SUBCUTANEOUS
Status: DISCONTINUED | OUTPATIENT
Start: 2024-10-30 | End: 2024-10-31 | Stop reason: HOSPADM

## 2024-10-30 RX ORDER — SODIUM CHLORIDE 9 MG/ML
INJECTION, SOLUTION INTRAVENOUS CONTINUOUS
Status: DISCONTINUED | OUTPATIENT
Start: 2024-10-30 | End: 2024-10-31

## 2024-10-30 RX ORDER — IOPAMIDOL 755 MG/ML
90 INJECTION, SOLUTION INTRAVASCULAR ONCE
Status: COMPLETED | OUTPATIENT
Start: 2024-10-30 | End: 2024-10-30

## 2024-10-30 RX ORDER — AMOXICILLIN 250 MG
2 CAPSULE ORAL 2 TIMES DAILY PRN
Status: DISCONTINUED | OUTPATIENT
Start: 2024-10-30 | End: 2024-10-31 | Stop reason: HOSPADM

## 2024-10-30 RX ORDER — PIPERACILLIN SODIUM, TAZOBACTAM SODIUM 3; .375 G/15ML; G/15ML
3.38 INJECTION, POWDER, LYOPHILIZED, FOR SOLUTION INTRAVENOUS ONCE
Status: COMPLETED | OUTPATIENT
Start: 2024-10-30 | End: 2024-10-30

## 2024-10-30 RX ORDER — KETOROLAC TROMETHAMINE 15 MG/ML
15 INJECTION, SOLUTION INTRAMUSCULAR; INTRAVENOUS
Status: COMPLETED | OUTPATIENT
Start: 2024-10-30 | End: 2024-10-30

## 2024-10-30 RX ORDER — PIPERACILLIN SODIUM, TAZOBACTAM SODIUM 3; .375 G/15ML; G/15ML
3.38 INJECTION, POWDER, LYOPHILIZED, FOR SOLUTION INTRAVENOUS EVERY 8 HOURS
Status: DISCONTINUED | OUTPATIENT
Start: 2024-10-31 | End: 2024-10-31 | Stop reason: HOSPADM

## 2024-10-30 RX ORDER — NALOXONE HYDROCHLORIDE 0.4 MG/ML
0.4 INJECTION, SOLUTION INTRAMUSCULAR; INTRAVENOUS; SUBCUTANEOUS
Status: DISCONTINUED | OUTPATIENT
Start: 2024-10-30 | End: 2024-10-31 | Stop reason: HOSPADM

## 2024-10-30 RX ORDER — ONDANSETRON 2 MG/ML
4 INJECTION INTRAMUSCULAR; INTRAVENOUS
Status: COMPLETED | OUTPATIENT
Start: 2024-10-30 | End: 2024-10-30

## 2024-10-30 RX ORDER — ONDANSETRON 4 MG/1
4 TABLET, ORALLY DISINTEGRATING ORAL EVERY 6 HOURS PRN
Status: DISCONTINUED | OUTPATIENT
Start: 2024-10-30 | End: 2024-10-31 | Stop reason: HOSPADM

## 2024-10-30 RX ORDER — HYDROMORPHONE HCL IN WATER/PF 6 MG/30 ML
0.2 PATIENT CONTROLLED ANALGESIA SYRINGE INTRAVENOUS
Status: DISCONTINUED | OUTPATIENT
Start: 2024-10-30 | End: 2024-10-31 | Stop reason: HOSPADM

## 2024-10-30 RX ORDER — ONDANSETRON 2 MG/ML
4 INJECTION INTRAMUSCULAR; INTRAVENOUS EVERY 6 HOURS PRN
Status: DISCONTINUED | OUTPATIENT
Start: 2024-10-30 | End: 2024-10-31 | Stop reason: HOSPADM

## 2024-10-30 RX ADMIN — KETOROLAC TROMETHAMINE 15 MG: 15 INJECTION, SOLUTION INTRAMUSCULAR; INTRAVENOUS at 16:23

## 2024-10-30 RX ADMIN — IOPAMIDOL 90 ML: 755 INJECTION, SOLUTION INTRAVENOUS at 18:37

## 2024-10-30 RX ADMIN — PIPERACILLIN AND TAZOBACTAM 3.38 G: 3; .375 INJECTION, POWDER, FOR SOLUTION INTRAVENOUS at 19:31

## 2024-10-30 RX ADMIN — DOCUSATE SODIUM 100 MG: 100 CAPSULE, LIQUID FILLED ORAL at 22:14

## 2024-10-30 RX ADMIN — SODIUM CHLORIDE, POTASSIUM CHLORIDE, SODIUM LACTATE AND CALCIUM CHLORIDE 1000 ML: 600; 310; 30; 20 INJECTION, SOLUTION INTRAVENOUS at 19:16

## 2024-10-30 RX ADMIN — SODIUM CHLORIDE: 9 INJECTION, SOLUTION INTRAVENOUS at 23:59

## 2024-10-30 RX ADMIN — ONDANSETRON 4 MG: 2 INJECTION INTRAMUSCULAR; INTRAVENOUS at 16:26

## 2024-10-30 RX ADMIN — OXYCODONE HYDROCHLORIDE 5 MG: 5 TABLET ORAL at 22:35

## 2024-10-30 ASSESSMENT — COLUMBIA-SUICIDE SEVERITY RATING SCALE - C-SSRS
2. HAVE YOU ACTUALLY HAD ANY THOUGHTS OF KILLING YOURSELF IN THE PAST MONTH?: YES
5. HAVE YOU STARTED TO WORK OUT OR WORKED OUT THE DETAILS OF HOW TO KILL YOURSELF? DO YOU INTEND TO CARRY OUT THIS PLAN?: NO
3. HAVE YOU BEEN THINKING ABOUT HOW YOU MIGHT KILL YOURSELF?: NO
4. HAVE YOU HAD THESE THOUGHTS AND HAD SOME INTENTION OF ACTING ON THEM?: NO
6. HAVE YOU EVER DONE ANYTHING, STARTED TO DO ANYTHING, OR PREPARED TO DO ANYTHING TO END YOUR LIFE?: YES
1. IN THE PAST MONTH, HAVE YOU WISHED YOU WERE DEAD OR WISHED YOU COULD GO TO SLEEP AND NOT WAKE UP?: NO

## 2024-10-30 ASSESSMENT — ACTIVITIES OF DAILY LIVING (ADL)
ADLS_ACUITY_SCORE: 0

## 2024-10-30 NOTE — ED PROVIDER NOTES
Emergency Department Encounter  [facility] EMERGENCY DEPARTMENT  Tangela Mac   AGE: 34 year old SEX: female  YOB: 1990  MRN: [id number]      EVALUATION DATE & TIME: 10/30/2024  3:54 PM ; PCP: No Ref-Primary, Physician   ED PROVIDER: Rebeca Saleem PA-C    CHIEF COMPLAINT: Abdominal Pain      FINAL IMPRESSION       ICD-10-CM    1. Acute cholecystitis  K81.0           MEDICAL DECISION MAKING   34 year old 6 week post partum female with a history of asthma, postpartum depression, and recent hospital admission ( - ) for acute pyelonephritis and endometritis following  on  who presents to the ED for evaluation of 3 days of upper abdominal pain that initially started in the LUQ but has now spread bilaterally and is radiating to the upper back.  Pain is worse with deep inspiration.  Patient does endorse a fever for the past 3 days.  No urinary or vaginal symptoms.    ED Course as of 10/30/24 1953   Wed Oct 30, 2024   1607 I met and introduced myself to the patient. I gathered initial history and performed my physical exam.  On exam, patient is well-appearing and in no acute distress.  There is abdominal tenderness to epigastrium and RUQ and LUQ.  No CVA tenderness bilaterally.  Plan for belly labs, urine testing, CT abdomen and pelvis.   1706 CBC with platelets differential(!)  CBC without leukocytosis or significant anemia, hemoglobin stable.   1706 Basic metabolic panel(!)  BMP without hypo/hyperglycemia, metabolic acidosis, and emergent electrolyte derangement. Kidney function maintained.    Lipase: 14  Doubt pancreatitis.   1706 Troponin T, High Sensitivity: <6  ACS ruled out per troponin algorithm.   1706 ALT(!): 60  Mild increase. 56 on 2024.   1706 Hepatic function panel(!)  No significant elevation is liver transaminases. Bilirubin WNL.   1810 HCG Qualitative Serum: Negative  Doubt pregnancy and pregnancy related complications.    1823 I reassessed patient who feels significantly improved after IV Toradol. Going to CT.    1900 CT Abdomen Pelvis w Contrast  Dilated gallbladder with pericholecystic edema suspicious for acute cholecystitis, plan for RUQ US   1903 Updated patient on findings.   1919 I have staffed the patient with Dr. Jo-Ann Barajas, ED MD, who has evaluated the patient and agrees with all aspects of today's care. Zosyn started.   1921 Placed call to hospitalist for admission pending RUQ US. Spoke to general surgery in the ED who would like us to admit the patient as they do not have OR time for a gallbladder tonight.    1951 Spoke to MD Richardson who accepts patient for admission.        Medications given today in the emergency department:  Medications   piperacillin-tazobactam (ZOSYN) 3.375 g vial to attach to  mL bag (3.375 g Intravenous $New Bag 10/30/24 1931)   ketorolac (TORADOL) injection 15 mg (15 mg Intravenous $Given 10/30/24 1623)   ondansetron (ZOFRAN) injection 4 mg (4 mg Intravenous $Given 10/30/24 1626)   iopamidol (ISOVUE-370) solution 90 mL (90 mLs Intravenous $Given 10/30/24 1837)   lactated ringers BOLUS 1,000 mL (1,000 mLs Intravenous $New Bag 10/30/24 1916)     Consults: General Surgery (Everardo Carrillo MD)    Assessment/Plan: Presentation consistent with acute cholecystitis.  Plan to admit patient to the hospital awaiting surgery. RUQ ultrasound pending.  Patient in agreement with plan.  Acutely serious/life threatening considerations:    ACS (no chest pain, EKG non ischemic)  Pulmonary embolism (Wells low risk, PERC negative)  Hepatobiliary disease   Diverticulitis  UTI/pyelonephritis  Renal calculi  Pancreatitis  Hepatitis  Pneumonia  Intestinal ischemia (no significant risk factors, rebound, or guarding)  Aortic dissection (hemodynamically stable hours out from onset, equal pulses and BP in bilateral UE, no known aortic pathology, story does not fit)    New prescriptions started at today's ED visit:    New Prescriptions    No medications on file      Medical Decision Making  Obtained supplemental history:Supplemental history obtained?: No  Reviewed external records: External records reviewed?: Documented in chart  Care impacted by chronic illness:Hypertension and Mental Health  Care significantly affected by social determinants of health:N/A  Did you consider but not order tests?: Work up considered but not performed and documented in chart, if applicable  Did you interpret images independently?: Independent interpretation of ECG and images noted in documentation, when applicable.  Consultation discussion with other provider:Did you involve another provider (consultant, , pharmacy, etc.)?: I discussed the care with another health care provider, see documentation for details.  Admit.    Not Applicable     =================================================================      HISTORY OF PRESENT ILLNESS   Patient information was obtained from: patient   Use of Intrepreter: N/A     Tangela Mac is a 34 year old 6 week post partum female with a pertinent history of asthma, postpartum depression, and recent hospital admission ( - ) for acute pyelonephritis and endometritis following  on  who presents to the ED by private vehicle for evaluation of abdominal pain.  Patient reports that for the last 3 days she has been experiencing upper abdominal pain that initially started on the left upper quadrant but has now spread bilaterally and is radiating to the upper back.  She has not had pain like this before.  After being released from the hospital on  she reports feeling better and did finish her 10-day course of antibiotics.  She also endorses nausea and vomiting 2-3 times per day.  Abdominal pain is worse with deep inspiration.  Taking acetaminophen at home with some relief in symptoms.  Not breast-feeding.  Denies any dysuria, increased urinary urgency, hematuria.  No abnormal  vaginal discharge or vaginal bleeding.  Endorses fevers for the past 3 days, 100  F at noon which has resolved with acetaminophen.    Patient did endorse suicidal thoughts in the past month.  She has been diagnosed with postpartum depression and is taking medications.  Denies having a suicide plan.  Does follow with psychiatry, appointment upcoming.    Per chart review,  2024-2024: Patient hospitalized for acute pyelonephritis and endometritis following delivery.   CT abd pelvis: normal aorta and abdominal vasculature  2024 -     MEDICAL HISTORY   No past medical history on file.    No past surgical history on file.    No family history on file.           There is no immunization history on file for this patient.     No current outpatient medications on file.      PHYSICAL EXAM   VITALS:  Patient Vitals for the past 24 hrs:   BP Temp Temp src Pulse Resp SpO2 Height Weight   10/30/24 1904 129/56 -- -- 83 -- 97 % -- --   10/30/24 1849 130/60 -- -- 85 -- 97 % -- --   10/30/24 1841 (!) 141/68 -- -- 78 18 97 % -- --   10/30/24 1820 137/77 -- -- -- 18 97 % -- --   10/30/24 1819 -- -- -- -- -- 97 % -- --   10/30/24 1815 124/74 -- -- 78 18 97 % -- --   10/30/24 1800 124/74 -- -- 82 18 97 % -- --   10/30/24 1745 125/66 -- -- 84 18 97 % -- --   10/30/24 1730 124/69 -- -- 79 18 97 % -- --   10/30/24 1715 134/68 -- -- 77 18 96 % -- --   10/30/24 1700 134/66 -- -- 80 18 96 % -- --   10/30/24 1646 (!) 148/58 -- -- 78 18 97 % -- --   10/30/24 1645 -- -- -- 80 -- 97 % -- --   10/30/24 1504 130/73 98.2  F (36.8  C) Oral 81 16 97 % 1.524 m (5') 100.1 kg (220 lb 11.2 oz)     Body mass index is 43.1 kg/m .     Vitals reviewed. Nursing notes reviewed.  CONSITUTIONAL: Generally well appearing female in no acute distress. Well developed and nourished.   EYES: Conjunctivae clear without exudates or hemorrhage. Sclera non-icteric. EOM grossly intact.   HENT: Normocephalic, atraumatic.  Moist mucous membranes. Oral  cavity without lesions or nodules. Oropharynx without erythema, exudate or swelling. Uvula midline.   NECK: Supple, gross ROM intact.   RESPIRATORY: Unlabored respiratory effort, speaks in full sentences.  Lungs clear to auscultation bilaterally without rhonchi, wheezes, rales.   CARDIO: Normal heart rate, regular rhythm, no murmurs. No pedal edema. 2+ radial pulses equal bilaterally.   GI: Soft, nondistended. Abdominal tenderness to epigastrium, RUQ, and LUQ without rebound tenderness or guarding. No CVA tenderness.  MSK: Moving all 4 extremities intentionally and without pain. No joint swelling, redness, or obvious deformity.  SKIN: Warm, dry. No rashes or lesions.  NEURO:  AxOx4. CN II-XII grossly intact, but not individually tested. No focal neurological deficits.   PSYCH: Thoughts linear and responses appropriate. Cooperative. Appropriate mood and affect.     LABS & IMAGING   All pertinent labs and imaging reviewed and interpreted.  Results for orders placed or performed during the hospital encounter of 10/30/24   CT Abdomen Pelvis w Contrast    Impression    IMPRESSION:   1.  Abnormal dilated gallbladder with associated pericholecystic edema. This is highly suspicious for acute cholecystitis. Recommend correlation with gallbladder ultrasound.  2.  Hepatic steatosis and mild hepatosplenomegaly.   Basic metabolic panel   Result Value Ref Range    Sodium 141 135 - 145 mmol/L    Potassium 3.5 3.4 - 5.3 mmol/L    Chloride 106 98 - 107 mmol/L    Carbon Dioxide (CO2) 22 22 - 29 mmol/L    Anion Gap 13 7 - 15 mmol/L    Urea Nitrogen 4.7 (L) 6.0 - 20.0 mg/dL    Creatinine 0.68 0.51 - 0.95 mg/dL    GFR Estimate >90 >60 mL/min/1.73m2    Calcium 8.5 (L) 8.8 - 10.4 mg/dL    Glucose 89 70 - 99 mg/dL   Hepatic function panel   Result Value Ref Range    Protein Total 7.0 6.4 - 8.3 g/dL    Albumin 4.1 3.5 - 5.2 g/dL    Bilirubin Total 0.5 <=1.2 mg/dL    Alkaline Phosphatase 127 40 - 150 U/L    AST 34 0 - 45 U/L    ALT 60 (H) 0  - 50 U/L    Bilirubin Direct <0.20 0.00 - 0.30 mg/dL   Result Value Ref Range    Lipase 14 13 - 60 U/L   CBC with platelets and differential   Result Value Ref Range    WBC Count 9.3 4.0 - 11.0 10e3/uL    RBC Count 4.41 3.80 - 5.20 10e6/uL    Hemoglobin 11.3 (L) 11.7 - 15.7 g/dL    Hematocrit 37.3 35.0 - 47.0 %    MCV 85 78 - 100 fL    MCH 25.6 (L) 26.5 - 33.0 pg    MCHC 30.3 (L) 31.5 - 36.5 g/dL    RDW 18.8 (H) 10.0 - 15.0 %    Platelet Count 234 150 - 450 10e3/uL    % Neutrophils 75 %    % Lymphocytes 15 %    % Monocytes 6 %    % Eosinophils 3 %    % Basophils 0 %    % Immature Granulocytes 1 %    NRBCs per 100 WBC 0 <1 /100    Absolute Neutrophils 7.0 1.6 - 8.3 10e3/uL    Absolute Lymphocytes 1.4 0.8 - 5.3 10e3/uL    Absolute Monocytes 0.5 0.0 - 1.3 10e3/uL    Absolute Eosinophils 0.3 0.0 - 0.7 10e3/uL    Absolute Basophils 0.0 0.0 - 0.2 10e3/uL    Absolute Immature Granulocytes 0.1 <=0.4 10e3/uL    Absolute NRBCs 0.0 10e3/uL   Result Value Ref Range    Troponin T, High Sensitivity <6 <=14 ng/L   Extra Blue Top Tube   Result Value Ref Range    Hold Specimen JIC    Extra Red Top Tube   Result Value Ref Range    Hold Specimen JIC    HCG QUALitative pregnancy (blood)   Result Value Ref Range    hCG Serum Qualitative Negative Negative       ECG:  Performed at: [facility] EMERGENCY DEPARTMENT  Impression: Normal sinus rhythm at a rate of 82 bpm without ST elevation, depression, or signs of ischemia  Comparisons: No previous ECGs available for comparison.    EKG was collected and independently interpreted by myself and also confirmed by Dr. Vladislav Martinez, ED MD.    Rebeca Saleem PA-C   Emergency Medicine   [facility] EMERGENCY DEPARTMENT  [address] [place] [id number] [phone]  Dept: [phone]     Rebeca Saleem PA-C  10/30/24 1953

## 2024-10-30 NOTE — ED TRIAGE NOTES
Patient arrives to ED for evaluation of bilateral upper abdominal pain, nausea and vomiting over the past 3 days. Pain radiates to her back. Took tylenol at home around noon.     Patient is 6 weeks postpartum; during screening she does endorse some suicidal ideation in the past month. Denies any method, plan or intent. States she is on antidepressant and has an appointment with her psychiatrist in one week.     Triage Assessment (Adult)       Row Name 10/30/24 1506          Triage Assessment    Airway WDL WDL        Respiratory WDL    Respiratory WDL WDL        Cardiac WDL    Cardiac WDL WDL

## 2024-10-30 NOTE — PROGRESS NOTES
URGENT CARE  Assessment & Plan   Assessment:   Tangela Mac is a 34 year old female who's clinical presentation today is consistent with:   1. Abdominal pain, generalized   Plan:  Sending patient to the ED for higher level of care given her abdominal pain, concern for acute abdomen; recommend labs and possible CT scan, patient agreeable and will self drive       [facility]ae[facility] URGENT CARE [place]      ______________________________________________________________________      Subjective     HPI: Tangela Mac  is a 34 year old  female who presents today for evaluation the following concerns:   Patient endorses she just had a baby six weeks ago, on 9/19 and was admitted into the hospital on 9/27 for pyelonephritis and endometriosis, patient presents today endorsing that for the last 3 days she has been experiencing upper abdominal pain that initially started on the left upper quadrant but has now spread bilaterally and is radiating to the upper back. She has not had pain like this before. After being released from the hospital on 09/29 she reports feeling better and did finish her 10-day course of antibiotics.   Patient endorses nausea and vomiting 2-3 times per day. Patient states she is not breast-feeding.   She denies any dysuria, urinary urgency, or hematuria. No abnormal vaginal discharge or vaginal bleeding.   She endorses fevers for the past 3 days     Review of Systems:  Pertinent review of systems as reflected in HPI, otherwise negative.     Objective    Physical Exam:  Vitals:    10/30/24 1349   BP: 132/78   Pulse: 88   Resp: 20   Temp: 99.2  F (37.3  C)   SpO2: 98%      General:   alert and oriented, no acute distress, non ill-appearing   Vital signs reviewed: afebrile and normotensive    SKIN: intact   LUNG: normal work of breathing, good respiratory effort without retractions, good air  movement, non labored, inspection reveals normal chest expansion w/  inspiration   ABD: Bowel sounds active in all  quadrants   soft,  obese   Tender to palpation upper abdomen and worse on the left    No rebound tenderness appreciated   tense, with some guarding present, no rigidity present   ______________________________________________________________________    I explained my diagnostic considerations and recommendations to the patient, who voiced understanding and agreement with the treatment plan.   All questions were answered.   We discussed potential side effects, risks and benefits of any prescribed or recommended therapies, as well as expectations for response to treatments.  Please see AVS for any patient instructions & handouts given.   Patient was advised to contact the Nurse Care Line, their Primary Care provider, Urgent Care, or the Emergency Department if there are new or worsening symptoms, or call 911 for emergencies.

## 2024-10-31 ENCOUNTER — HOSPITAL ENCOUNTER (OUTPATIENT)
Facility: HOSPITAL | Age: 34
End: 2024-10-31
Attending: SPECIALIST | Admitting: SPECIALIST
Payer: COMMERCIAL

## 2024-10-31 ENCOUNTER — ANESTHESIA EVENT (OUTPATIENT)
Dept: SURGERY | Facility: HOSPITAL | Age: 34
DRG: 769 | End: 2024-10-31
Payer: COMMERCIAL

## 2024-10-31 ENCOUNTER — ANESTHESIA (OUTPATIENT)
Dept: SURGERY | Facility: HOSPITAL | Age: 34
DRG: 769 | End: 2024-10-31
Payer: COMMERCIAL

## 2024-10-31 VITALS
BODY MASS INDEX: 43.33 KG/M2 | TEMPERATURE: 97.7 F | RESPIRATION RATE: 15 BRPM | DIASTOLIC BLOOD PRESSURE: 61 MMHG | WEIGHT: 220.7 LBS | HEART RATE: 82 BPM | OXYGEN SATURATION: 95 % | HEIGHT: 60 IN | SYSTOLIC BLOOD PRESSURE: 116 MMHG

## 2024-10-31 LAB
ALBUMIN SERPL BCG-MCNC: 3.6 G/DL (ref 3.5–5.2)
ALP SERPL-CCNC: 122 U/L (ref 40–150)
ALT SERPL W P-5'-P-CCNC: 54 U/L (ref 0–50)
ANION GAP SERPL CALCULATED.3IONS-SCNC: 11 MMOL/L (ref 7–15)
AST SERPL W P-5'-P-CCNC: 33 U/L (ref 0–45)
BILIRUB SERPL-MCNC: 0.7 MG/DL
BUN SERPL-MCNC: 5.5 MG/DL (ref 6–20)
CALCIUM SERPL-MCNC: 8.2 MG/DL (ref 8.8–10.4)
CHLORIDE SERPL-SCNC: 106 MMOL/L (ref 98–107)
CREAT SERPL-MCNC: 0.76 MG/DL (ref 0.51–0.95)
EGFRCR SERPLBLD CKD-EPI 2021: >90 ML/MIN/1.73M2
ERYTHROCYTE [DISTWIDTH] IN BLOOD BY AUTOMATED COUNT: 18.8 % (ref 10–15)
GLUCOSE SERPL-MCNC: 94 MG/DL (ref 70–99)
HCO3 SERPL-SCNC: 24 MMOL/L (ref 22–29)
HCT VFR BLD AUTO: 33.5 % (ref 35–47)
HGB BLD-MCNC: 10.3 G/DL (ref 11.7–15.7)
MCH RBC QN AUTO: 26.2 PG (ref 26.5–33)
MCHC RBC AUTO-ENTMCNC: 30.7 G/DL (ref 31.5–36.5)
MCV RBC AUTO: 85 FL (ref 78–100)
PLATELET # BLD AUTO: 192 10E3/UL (ref 150–450)
POTASSIUM SERPL-SCNC: 3.6 MMOL/L (ref 3.4–5.3)
PROT SERPL-MCNC: 6.4 G/DL (ref 6.4–8.3)
RBC # BLD AUTO: 3.93 10E6/UL (ref 3.8–5.2)
SODIUM SERPL-SCNC: 141 MMOL/L (ref 135–145)
WBC # BLD AUTO: 7.6 10E3/UL (ref 4–11)

## 2024-10-31 PROCEDURE — 250N000011 HC RX IP 250 OP 636: Performed by: NURSE ANESTHETIST, CERTIFIED REGISTERED

## 2024-10-31 PROCEDURE — 96376 TX/PRO/DX INJ SAME DRUG ADON: CPT | Mod: 59

## 2024-10-31 PROCEDURE — 47562 LAPAROSCOPIC CHOLECYSTECTOMY: CPT | Performed by: SPECIALIST

## 2024-10-31 PROCEDURE — 258N000003 HC RX IP 258 OP 636: Performed by: NURSE ANESTHETIST, CERTIFIED REGISTERED

## 2024-10-31 PROCEDURE — 0FT44ZZ RESECTION OF GALLBLADDER, PERCUTANEOUS ENDOSCOPIC APPROACH: ICD-10-PCS | Performed by: SPECIALIST

## 2024-10-31 PROCEDURE — 258N000003 HC RX IP 258 OP 636: Performed by: SPECIALIST

## 2024-10-31 PROCEDURE — 999N000141 HC STATISTIC PRE-PROCEDURE NURSING ASSESSMENT: Performed by: SPECIALIST

## 2024-10-31 PROCEDURE — 99203 OFFICE O/P NEW LOW 30 MIN: CPT | Mod: 57 | Performed by: SPECIALIST

## 2024-10-31 PROCEDURE — 250N000009 HC RX 250: Performed by: NURSE ANESTHETIST, CERTIFIED REGISTERED

## 2024-10-31 PROCEDURE — 85018 HEMOGLOBIN: CPT | Performed by: HOSPITALIST

## 2024-10-31 PROCEDURE — 360N000076 HC SURGERY LEVEL 3, PER MIN: Performed by: SPECIALIST

## 2024-10-31 PROCEDURE — 250N000011 HC RX IP 250 OP 636: Performed by: SPECIALIST

## 2024-10-31 PROCEDURE — 250N000011 HC RX IP 250 OP 636: Performed by: ANESTHESIOLOGY

## 2024-10-31 PROCEDURE — 250N000025 HC SEVOFLURANE, PER MIN: Performed by: SPECIALIST

## 2024-10-31 PROCEDURE — G0378 HOSPITAL OBSERVATION PER HR: HCPCS

## 2024-10-31 PROCEDURE — 250N000013 HC RX MED GY IP 250 OP 250 PS 637: Performed by: ANESTHESIOLOGY

## 2024-10-31 PROCEDURE — 258N000003 HC RX IP 258 OP 636: Performed by: ANESTHESIOLOGY

## 2024-10-31 PROCEDURE — 82040 ASSAY OF SERUM ALBUMIN: CPT | Performed by: HOSPITALIST

## 2024-10-31 PROCEDURE — 710N000012 HC RECOVERY PHASE 2, PER MINUTE: Performed by: SPECIALIST

## 2024-10-31 PROCEDURE — 250N000013 HC RX MED GY IP 250 OP 250 PS 637: Performed by: HOSPITALIST

## 2024-10-31 PROCEDURE — 36415 COLL VENOUS BLD VENIPUNCTURE: CPT | Performed by: HOSPITALIST

## 2024-10-31 PROCEDURE — 99239 HOSP IP/OBS DSCHRG MGMT >30: CPT | Performed by: INTERNAL MEDICINE

## 2024-10-31 PROCEDURE — 250N000011 HC RX IP 250 OP 636: Performed by: HOSPITALIST

## 2024-10-31 PROCEDURE — 710N000009 HC RECOVERY PHASE 1, LEVEL 1, PER MIN: Performed by: SPECIALIST

## 2024-10-31 PROCEDURE — 272N000001 HC OR GENERAL SUPPLY STERILE: Performed by: SPECIALIST

## 2024-10-31 PROCEDURE — 88304 TISSUE EXAM BY PATHOLOGIST: CPT | Mod: TC | Performed by: SPECIALIST

## 2024-10-31 PROCEDURE — 370N000017 HC ANESTHESIA TECHNICAL FEE, PER MIN: Performed by: SPECIALIST

## 2024-10-31 RX ORDER — SODIUM CHLORIDE, SODIUM LACTATE, POTASSIUM CHLORIDE, CALCIUM CHLORIDE 600; 310; 30; 20 MG/100ML; MG/100ML; MG/100ML; MG/100ML
INJECTION, SOLUTION INTRAVENOUS CONTINUOUS
Status: DISCONTINUED | OUTPATIENT
Start: 2024-10-31 | End: 2024-10-31

## 2024-10-31 RX ORDER — PROPOFOL 10 MG/ML
INJECTION, EMULSION INTRAVENOUS PRN
Status: DISCONTINUED | OUTPATIENT
Start: 2024-10-31 | End: 2024-10-31

## 2024-10-31 RX ORDER — ONDANSETRON 2 MG/ML
4 INJECTION INTRAMUSCULAR; INTRAVENOUS EVERY 30 MIN PRN
Status: DISCONTINUED | OUTPATIENT
Start: 2024-10-31 | End: 2024-10-31 | Stop reason: HOSPADM

## 2024-10-31 RX ORDER — DEXAMETHASONE SODIUM PHOSPHATE 10 MG/ML
INJECTION, SOLUTION INTRAMUSCULAR; INTRAVENOUS PRN
Status: DISCONTINUED | OUTPATIENT
Start: 2024-10-31 | End: 2024-10-31

## 2024-10-31 RX ORDER — ONDANSETRON 4 MG/1
4 TABLET, ORALLY DISINTEGRATING ORAL EVERY 30 MIN PRN
Status: DISCONTINUED | OUTPATIENT
Start: 2024-10-31 | End: 2024-10-31 | Stop reason: HOSPADM

## 2024-10-31 RX ORDER — IBUPROFEN 200 MG
600 TABLET ORAL
Status: DISCONTINUED | OUTPATIENT
Start: 2024-10-31 | End: 2024-10-31 | Stop reason: HOSPADM

## 2024-10-31 RX ORDER — LIDOCAINE HYDROCHLORIDE 10 MG/ML
INJECTION, SOLUTION INFILTRATION; PERINEURAL PRN
Status: DISCONTINUED | OUTPATIENT
Start: 2024-10-31 | End: 2024-10-31

## 2024-10-31 RX ORDER — NALOXONE HYDROCHLORIDE 1 MG/ML
0.1 INJECTION INTRAMUSCULAR; INTRAVENOUS; SUBCUTANEOUS
Status: DISCONTINUED | OUTPATIENT
Start: 2024-10-31 | End: 2024-10-31 | Stop reason: HOSPADM

## 2024-10-31 RX ORDER — ACETAMINOPHEN 325 MG/1
975 TABLET ORAL ONCE
Status: COMPLETED | OUTPATIENT
Start: 2024-10-31 | End: 2024-10-31

## 2024-10-31 RX ORDER — OXYCODONE HYDROCHLORIDE 5 MG/1
5 TABLET ORAL
Status: COMPLETED | OUTPATIENT
Start: 2024-10-31 | End: 2024-10-31

## 2024-10-31 RX ORDER — SODIUM CHLORIDE, SODIUM LACTATE, POTASSIUM CHLORIDE, CALCIUM CHLORIDE 600; 310; 30; 20 MG/100ML; MG/100ML; MG/100ML; MG/100ML
INJECTION, SOLUTION INTRAVENOUS CONTINUOUS PRN
Status: DISCONTINUED | OUTPATIENT
Start: 2024-10-31 | End: 2024-10-31

## 2024-10-31 RX ORDER — KETOROLAC TROMETHAMINE 30 MG/ML
INJECTION, SOLUTION INTRAMUSCULAR; INTRAVENOUS PRN
Status: DISCONTINUED | OUTPATIENT
Start: 2024-10-31 | End: 2024-10-31

## 2024-10-31 RX ORDER — FENTANYL CITRATE 50 UG/ML
25 INJECTION, SOLUTION INTRAMUSCULAR; INTRAVENOUS EVERY 5 MIN PRN
Status: DISCONTINUED | OUTPATIENT
Start: 2024-10-31 | End: 2024-10-31 | Stop reason: HOSPADM

## 2024-10-31 RX ORDER — ONDANSETRON 2 MG/ML
INJECTION INTRAMUSCULAR; INTRAVENOUS PRN
Status: DISCONTINUED | OUTPATIENT
Start: 2024-10-31 | End: 2024-10-31

## 2024-10-31 RX ORDER — DEXAMETHASONE SODIUM PHOSPHATE 4 MG/ML
4 INJECTION, SOLUTION INTRA-ARTICULAR; INTRALESIONAL; INTRAMUSCULAR; INTRAVENOUS; SOFT TISSUE
Status: DISCONTINUED | OUTPATIENT
Start: 2024-10-31 | End: 2024-10-31 | Stop reason: HOSPADM

## 2024-10-31 RX ORDER — BUPIVACAINE HYDROCHLORIDE 2.5 MG/ML
INJECTION, SOLUTION INFILTRATION; PERINEURAL PRN
Status: DISCONTINUED | OUTPATIENT
Start: 2024-10-31 | End: 2024-10-31 | Stop reason: HOSPADM

## 2024-10-31 RX ORDER — GLYCOPYRROLATE 0.2 MG/ML
INJECTION, SOLUTION INTRAMUSCULAR; INTRAVENOUS PRN
Status: DISCONTINUED | OUTPATIENT
Start: 2024-10-31 | End: 2024-10-31

## 2024-10-31 RX ORDER — LIDOCAINE 40 MG/G
CREAM TOPICAL
Status: DISCONTINUED | OUTPATIENT
Start: 2024-10-31 | End: 2024-10-31 | Stop reason: HOSPADM

## 2024-10-31 RX ORDER — MEPERIDINE HYDROCHLORIDE 25 MG/ML
12.5 INJECTION INTRAMUSCULAR; INTRAVENOUS; SUBCUTANEOUS EVERY 5 MIN PRN
Status: DISCONTINUED | OUTPATIENT
Start: 2024-10-31 | End: 2024-10-31 | Stop reason: HOSPADM

## 2024-10-31 RX ORDER — DEXAMETHASONE SODIUM PHOSPHATE 10 MG/ML
4 INJECTION, SOLUTION INTRAMUSCULAR; INTRAVENOUS
Status: DISCONTINUED | OUTPATIENT
Start: 2024-10-31 | End: 2024-10-31 | Stop reason: HOSPADM

## 2024-10-31 RX ORDER — HYDROCODONE BITARTRATE AND ACETAMINOPHEN 5; 325 MG/1; MG/1
1 TABLET ORAL
Status: DISCONTINUED | OUTPATIENT
Start: 2024-10-31 | End: 2024-10-31 | Stop reason: HOSPADM

## 2024-10-31 RX ORDER — OXYCODONE HYDROCHLORIDE 5 MG/1
10 TABLET ORAL
Status: DISCONTINUED | OUTPATIENT
Start: 2024-10-31 | End: 2024-10-31 | Stop reason: HOSPADM

## 2024-10-31 RX ORDER — PROPOFOL 10 MG/ML
INJECTION, EMULSION INTRAVENOUS CONTINUOUS PRN
Status: DISCONTINUED | OUTPATIENT
Start: 2024-10-31 | End: 2024-10-31

## 2024-10-31 RX ORDER — SODIUM CHLORIDE, SODIUM LACTATE, POTASSIUM CHLORIDE, AND CALCIUM CHLORIDE .6; .31; .03; .02 G/100ML; G/100ML; G/100ML; G/100ML
IRRIGANT IRRIGATION PRN
Status: DISCONTINUED | OUTPATIENT
Start: 2024-10-31 | End: 2024-10-31

## 2024-10-31 RX ORDER — FENTANYL CITRATE 50 UG/ML
25 INJECTION, SOLUTION INTRAMUSCULAR; INTRAVENOUS
Status: DISCONTINUED | OUTPATIENT
Start: 2024-10-31 | End: 2024-10-31 | Stop reason: HOSPADM

## 2024-10-31 RX ORDER — FENTANYL CITRATE 50 UG/ML
50 INJECTION, SOLUTION INTRAMUSCULAR; INTRAVENOUS EVERY 5 MIN PRN
Status: DISCONTINUED | OUTPATIENT
Start: 2024-10-31 | End: 2024-10-31 | Stop reason: HOSPADM

## 2024-10-31 RX ORDER — MAGNESIUM SULFATE 4 G/50ML
4 INJECTION INTRAVENOUS ONCE
Status: COMPLETED | OUTPATIENT
Start: 2024-10-31 | End: 2024-10-31

## 2024-10-31 RX ORDER — OXYCODONE HYDROCHLORIDE 5 MG/1
5 TABLET ORAL EVERY 4 HOURS PRN
Qty: 12 TABLET | Refills: 0 | Status: SHIPPED | OUTPATIENT
Start: 2024-10-31

## 2024-10-31 RX ORDER — NALOXONE HYDROCHLORIDE 0.4 MG/ML
0.1 INJECTION, SOLUTION INTRAMUSCULAR; INTRAVENOUS; SUBCUTANEOUS
Status: DISCONTINUED | OUTPATIENT
Start: 2024-10-31 | End: 2024-10-31 | Stop reason: HOSPADM

## 2024-10-31 RX ORDER — CEFAZOLIN SODIUM 1 G/3ML
INJECTION, POWDER, FOR SOLUTION INTRAMUSCULAR; INTRAVENOUS PRN
Status: DISCONTINUED | OUTPATIENT
Start: 2024-10-31 | End: 2024-10-31

## 2024-10-31 RX ADMIN — OXYCODONE HYDROCHLORIDE 5 MG: 5 TABLET ORAL at 04:02

## 2024-10-31 RX ADMIN — ONDANSETRON 4 MG: 2 INJECTION INTRAMUSCULAR; INTRAVENOUS at 09:04

## 2024-10-31 RX ADMIN — SUGAMMADEX 200 MG: 100 INJECTION, SOLUTION INTRAVENOUS at 09:22

## 2024-10-31 RX ADMIN — DEXAMETHASONE SODIUM PHOSPHATE 10 MG: 10 INJECTION, SOLUTION INTRAMUSCULAR; INTRAVENOUS at 09:03

## 2024-10-31 RX ADMIN — PROPOFOL 100 MCG/KG/MIN: 10 INJECTION, EMULSION INTRAVENOUS at 08:52

## 2024-10-31 RX ADMIN — PROPOFOL 200 MG: 10 INJECTION, EMULSION INTRAVENOUS at 08:44

## 2024-10-31 RX ADMIN — ROCURONIUM BROMIDE 50 MG: 50 INJECTION, SOLUTION INTRAVENOUS at 08:44

## 2024-10-31 RX ADMIN — PIPERACILLIN AND TAZOBACTAM 3.38 G: 3; .375 INJECTION, POWDER, FOR SOLUTION INTRAVENOUS at 00:40

## 2024-10-31 RX ADMIN — GLYCOPYRROLATE 0.3 MG: 0.2 INJECTION INTRAMUSCULAR; INTRAVENOUS at 09:01

## 2024-10-31 RX ADMIN — MAGNESIUM SULFATE HEPTAHYDRATE 4 G: 80 INJECTION, SOLUTION INTRAVENOUS at 08:26

## 2024-10-31 RX ADMIN — CEFAZOLIN 2 G: 1 INJECTION, POWDER, FOR SOLUTION INTRAMUSCULAR; INTRAVENOUS at 08:58

## 2024-10-31 RX ADMIN — ACETAMINOPHEN 975 MG: 325 TABLET ORAL at 08:16

## 2024-10-31 RX ADMIN — SUGAMMADEX 150 MG: 100 INJECTION, SOLUTION INTRAVENOUS at 09:26

## 2024-10-31 RX ADMIN — FENTANYL CITRATE 25 MCG: 50 INJECTION, SOLUTION INTRAMUSCULAR; INTRAVENOUS at 10:16

## 2024-10-31 RX ADMIN — SODIUM CHLORIDE, POTASSIUM CHLORIDE, SODIUM LACTATE AND CALCIUM CHLORIDE: 600; 310; 30; 20 INJECTION, SOLUTION INTRAVENOUS at 08:29

## 2024-10-31 RX ADMIN — DEXMEDETOMIDINE HYDROCHLORIDE 8 MCG: 100 INJECTION, SOLUTION INTRAVENOUS at 08:54

## 2024-10-31 RX ADMIN — LIDOCAINE HYDROCHLORIDE 5 ML: 10 INJECTION, SOLUTION INFILTRATION; PERINEURAL at 08:44

## 2024-10-31 RX ADMIN — ONDANSETRON 4 MG: 2 INJECTION INTRAMUSCULAR; INTRAVENOUS at 10:06

## 2024-10-31 RX ADMIN — SODIUM CHLORIDE, POTASSIUM CHLORIDE, SODIUM LACTATE AND CALCIUM CHLORIDE: 600; 310; 30; 20 INJECTION, SOLUTION INTRAVENOUS at 08:26

## 2024-10-31 RX ADMIN — MIDAZOLAM HYDROCHLORIDE 2 MG: 1 INJECTION, SOLUTION INTRAMUSCULAR; INTRAVENOUS at 08:36

## 2024-10-31 RX ADMIN — KETOROLAC TROMETHAMINE 30 MG: 30 INJECTION, SOLUTION INTRAMUSCULAR at 09:20

## 2024-10-31 RX ADMIN — OXYCODONE HYDROCHLORIDE 5 MG: 5 TABLET ORAL at 11:50

## 2024-10-31 RX ADMIN — DEXMEDETOMIDINE HYDROCHLORIDE 12 MCG: 100 INJECTION, SOLUTION INTRAVENOUS at 08:40

## 2024-10-31 RX ADMIN — FENTANYL CITRATE 25 MCG: 50 INJECTION, SOLUTION INTRAMUSCULAR; INTRAVENOUS at 10:40

## 2024-10-31 RX ADMIN — FENTANYL CITRATE 100 MCG: 50 INJECTION, SOLUTION INTRAMUSCULAR; INTRAVENOUS at 08:46

## 2024-10-31 ASSESSMENT — ACTIVITIES OF DAILY LIVING (ADL)
ADLS_ACUITY_SCORE: 0

## 2024-10-31 NOTE — OP NOTE
Operative Note    Name:  Tangela Mac  PCP:  No Ref-Primary, Physician  Procedure Date:  10/31/2024       Procedure:  Procedure(s):  CHOLECYSTECTOMY, LAPAROSCOPIC     Pre-Procedure Diagnosis:  Acute cholecystitis [K81.0]     Post-Procedure Diagnosis:    Same     Surgeon(s):  Kellen Willard MD     Assistant: None      Anesthesia Type:  General       Findings:  Acutely inflamed gallbladder.  Stones in the neck of the gallbladder.    Operative Report:    The patient was properly identified and brought to the operating suite where they were placed in the supine position, general anesthetic was administered and prepped and draped in a sterile fashion.  A small incision was made below the umbilicus and a Veress needle passed into the abdominal cavity which was insufflated with carbon dioxide.  A 5mm trocar port was then placed followed by the camera.  Under direct vision a 10 mm port was placed in the epigastrium and two 5 mm ports in the right upper quadrant.  The gallbladder was visualized.  It was very distended.  I aspirated it to facilitate grasping it.  With traction on the gallbladder dissection was carried out in the triangle of Calot where the cystic duct and artery were carefully  identified, dissected free, clipped and divided.  The gallbladder was removed from the gallbladder bed with electrocautery with no difficulty and pulled up through the epigastric incision.  The port was replaced and the entire area irrigated until clear.  Hemostasis was assured.  All the ports removed and each site closed with subcuticular sutures of 4-0 Monocryl.  Sterile dressings were placed.  Each site was also infiltrated with quarter percent Marcaine for a total of 30 mL.  The patient tolerated the procedure well.    Estimated Blood Loss:   10 cc    Specimens:    ID Type Source Tests Collected by Time Destination   1 : GALLBLADDER Tissue Gallbladder SURGICAL PATHOLOGY EXAM Kellen Willard MD 10/31/2024  9:16 AM             Drains:        Complications:    None    Kellen Willard MD     Date: 10/31/2024  Time: 9:39 AM

## 2024-10-31 NOTE — ANESTHESIA POSTPROCEDURE EVALUATION
Patient: Tangela Mac    Procedure: Procedure(s):  CHOLECYSTECTOMY, LAPAROSCOPIC       Anesthesia Type:  General    Note:  Disposition: Outpatient   Postop Pain Control: Uneventful            Sign Out: Well controlled pain   PONV: No   Neuro/Psych: Uneventful            Sign Out: Acceptable/Baseline neuro status   Airway/Respiratory: Uneventful            Sign Out: Acceptable/Baseline resp. status   CV/Hemodynamics: Uneventful            Sign Out: Acceptable CV status; No obvious hypovolemia; No obvious fluid overload   Other NRE: NONE   DID A NON-ROUTINE EVENT OCCUR? No       Last vitals:  Vitals Value Taken Time   /62 10/31/24 1100   Temp 36.7  C (98.06  F) 10/31/24 1103   Pulse 74 10/31/24 1103   Resp 24 10/31/24 1103   SpO2 96 % 10/31/24 1103   Vitals shown include unfiled device data.    Electronically Signed By: Janene Hermosillo MD  October 31, 2024  11:04 AM  
Ambulatory

## 2024-10-31 NOTE — PLAN OF CARE
Goal Outcome Evaluation:         Pt left for surgery via cart accompanied by transporter.  Belongings with pt, VSS, reported off status to pre op RN.

## 2024-10-31 NOTE — H&P
[facility]    History and Physical - Hospitalist Service       Date of Admission:  10/30/2024    Assessment & Plan      Tangela Mac is a 34 year old female admitted on 10/30/2024.   Principal Problem:    Acute cholecystitis  Active Problems:    Moderate recurrent major depression (H)    Pregnancy induced hypertension, postpartum    Fatty liver    33 yo, recently 6 week post-partum and hx of recent pyelo, presents with acute cholecystitis.   Ultrasound IMPRESSION:  1.  Gallstones, with wall thickening and pericholecystic fluid compatible with acute cholecystitis.  2.  Hepatomegaly and diffuse fatty infiltration of the liver.  -plan OR in AM,   -admit for IV antibiotics, pain control    Principal Problem:    Acute cholecystitis  Active Problems:    Moderate recurrent major depression (H)    Pregnancy induced hypertension, postpartum    Fatty liver        Acute cholecystitis  -Admit and provide IV antibiotics she has received Zosyn will continue this for adequate intra-abdominal organism gram-positive negative anaerobic coverage  -Surgical consult for operative intervention.  Depending on postoperative cares- may be able to switch oral antibiotics  -Multimodal pain and nausea control  -Also has fatty liver and should continue cholesterol monitoring on outpatient basis to prevent any late stage hepatitis or cirrhosis  -In view of recent pyelowill also get a UA although antibiotic should treat that well.does have right upper quadrant tenderness without any guarding rigidity or rebound-        Moderate recurrent major depression (H)  -Continue home Prozac      Pregnancy induced hypertension, postpartum  Per history, with her weight is possible she may be developing some essential hypertension although in context of current illness this is likely stress and pain induced, recommend ambulatory monitoring-      Initial orders were placed.  Request consultation to to surgery appreciated  assistance and recommendations. .  Anticipated length of stay of up to 2 midnights of hospitalization depending on progression, planning,findings,further testing or treatment needs. Services are medically necessary for evaluation and treatment of the acute conditions with the treatment plan as outlined above.  Current problem list also has been updated.            Diet: NPO per Anesthesia Guidelines for Procedure/Surgery Except for: Meds  DVT Prophylaxis: Low Risk/Ambulatory with no VTE prophylaxis indicated -can consider postoperatively if stays longer  Alexander Catheter: Not present  Lines: None     Cardiac Monitoring: None  Code Status: Full Code    Clinically Significant Risk Factors Present on Admission           # Hypocalcemia: Lowest Ca = 8.5 mg/dL in last 2 days, will monitor and replace as appropriate         # Hypertension: Noted on problem list         # Severe Obesity: Estimated body mass index is 43.1 kg/m  as calculated from the following:    Height as of this encounter: 1.524 m (5').    Weight as of this encounter: 100.1 kg (220 lb 11.2 oz).              Disposition Plan     Medically Ready for Discharge: Anticipated Tomorrow           Alex Barker MD  Hospitalist Service  [facility]  Securely message with [employer] (more info)  Text page via [facility] Paging/Directory     ______________________________________________________________________    Chief Complaint   Abd pain    History is obtained from the patient, electronic health record, and emergency department physician    History of Present Illness   Tangela Mac is a 34 year old female with 6wk post partum 9/19. Hosp for endometritis/pyelonephritis. Now upper abd pain starting left now entire upper abdomen. Fevers.     Past Medical History    No past medical history on file.    Past Surgical History   No past surgical history on file.    Prior to Admission Medications   Prior to Admission Medications   Prescriptions  Last Dose Informant Patient Reported? Taking?   FLUoxetine (PROZAC) 20 MG capsule 10/29/2024 Noon  Yes Yes   Sig: Take 20 mg by mouth daily.      Facility-Administered Medications: None        Review of Systems    The 5 point Review of Systems is negative other than noted in the HPI or here.     Social History   I have reviewed this patient's social history and updated it with pertinent information if needed.         Family History     Healthy child recently      Allergies   Allergies   Allergen Reactions    Hydrocodone-Acetaminophen Nausea and Vomiting        Physical Exam   Vital Signs: Temp: 98.8  F (37.1  C) Temp src: Oral BP: (!) 145/66 Pulse: 84   Resp: 18 SpO2: 98 % O2 Device: None (Room air)    Weight: 220 lbs 11.2 oz    Alert awake  Vision Baseline  Neck supple  Oral mucosa moist  bilateral air entry heard,  S1-S2 normal  Abdomen is soft -does have right upper quadrant tenderness without any guarding rigidity or rebound-  Extremities are fully mobile and there is no visible swelling noted  No skin cyanosis  Neurologically- no new Gross deficits from baseline-Moving all 4 extremities  Psych-mood okay and appropriate to circumstances      Medical Decision Making     75 MINUTES SPENT BY ME on the date of service doing chart review, history, exam, documentation & further activities per the note.             Data   Results for orders placed or performed during the hospital encounter of 10/30/24 (from the past 24 hours)   CBC with platelets differential    Narrative    The following orders were created for panel order CBC with platelets differential.  Procedure                               Abnormality         Status                     ---------                               -----------         ------                     CBC with platelets and d...[[id number]]  Abnormal            Final result                 Please view results for these tests on the individual orders.   Basic metabolic panel   Result Value Ref  Range    Sodium 141 135 - 145 mmol/L    Potassium 3.5 3.4 - 5.3 mmol/L    Chloride 106 98 - 107 mmol/L    Carbon Dioxide (CO2) 22 22 - 29 mmol/L    Anion Gap 13 7 - 15 mmol/L    Urea Nitrogen 4.7 (L) 6.0 - 20.0 mg/dL    Creatinine 0.68 0.51 - 0.95 mg/dL    GFR Estimate >90 >60 mL/min/1.73m2    Calcium 8.5 (L) 8.8 - 10.4 mg/dL    Glucose 89 70 - 99 mg/dL   Hepatic function panel   Result Value Ref Range    Protein Total 7.0 6.4 - 8.3 g/dL    Albumin 4.1 3.5 - 5.2 g/dL    Bilirubin Total 0.5 <=1.2 mg/dL    Alkaline Phosphatase 127 40 - 150 U/L    AST 34 0 - 45 U/L    ALT 60 (H) 0 - 50 U/L    Bilirubin Direct <0.20 0.00 - 0.30 mg/dL   Lipase   Result Value Ref Range    Lipase 14 13 - 60 U/L   CBC with platelets and differential   Result Value Ref Range    WBC Count 9.3 4.0 - 11.0 10e3/uL    RBC Count 4.41 3.80 - 5.20 10e6/uL    Hemoglobin 11.3 (L) 11.7 - 15.7 g/dL    Hematocrit 37.3 35.0 - 47.0 %    MCV 85 78 - 100 fL    MCH 25.6 (L) 26.5 - 33.0 pg    MCHC 30.3 (L) 31.5 - 36.5 g/dL    RDW 18.8 (H) 10.0 - 15.0 %    Platelet Count 234 150 - 450 10e3/uL    % Neutrophils 75 %    % Lymphocytes 15 %    % Monocytes 6 %    % Eosinophils 3 %    % Basophils 0 %    % Immature Granulocytes 1 %    NRBCs per 100 WBC 0 <1 /100    Absolute Neutrophils 7.0 1.6 - 8.3 10e3/uL    Absolute Lymphocytes 1.4 0.8 - 5.3 10e3/uL    Absolute Monocytes 0.5 0.0 - 1.3 10e3/uL    Absolute Eosinophils 0.3 0.0 - 0.7 10e3/uL    Absolute Basophils 0.0 0.0 - 0.2 10e3/uL    Absolute Immature Granulocytes 0.1 <=0.4 10e3/uL    Absolute NRBCs 0.0 10e3/uL   Troponin T, High Sensitivity   Result Value Ref Range    Troponin T, High Sensitivity <6 <=14 ng/L   [place] Draw    Narrative    The following orders were created for panel order [place] Draw.  Procedure                               Abnormality         Status                     ---------                               -----------         ------                     Extra Blue Top Tube[[id number]]                               Final result               Extra Red Top Tube[[id number]]                               Final result                 Please view results for these tests on the individual orders.   Extra Blue Top Tube   Result Value Ref Range    Hold Specimen JIC    Extra Red Top Tube   Result Value Ref Range    Hold Specimen JIC    HCG QUALitative pregnancy (blood)   Result Value Ref Range    hCG Serum Qualitative Negative Negative   CT Abdomen Pelvis w Contrast    Narrative    EXAM: CT ABDOMEN PELVIS W CONTRAST  LOCATION: [facility] [place]  DATE: 10/30/2024    INDICATION: Upper abdominal pain with nausea and vomiting radiating to the back,  on   COMPARISON: None.  TECHNIQUE: CT scan of the abdomen and pelvis was performed following injection of IV contrast. Multiplanar reformats were obtained. Dose reduction techniques were used.  CONTRAST: 90 mL Isovue 370    FINDINGS:   LOWER CHEST: Small likely reactive paraesophageal, pericardial, cardiophrenic recess and pericaval lymph nodes.    HEPATOBILIARY: Mild hepatomegaly. Hepatic steatosis. Mild gallbladder distention with associated moderate pericholecystic edema. No radiodense gallstone. No biliary ductal dilatation.    PANCREAS: Normal.    SPLEEN: Normal.    ADRENAL GLANDS: Normal.    KIDNEYS/BLADDER: Normal.    BOWEL: No bowel obstruction or inflammatory process. Nonvisualized appendix. Mild colonic stool burden. Trace pelvic free fluid. No free air.    LYMPH NODES: Normal.    VASCULATURE: Normal. Patent central SMA and SMV.    PELVIC ORGANS: Right adnexal 2.5 cm cystic structure, likely a dominant ovarian follicle. Trace pelvic free fluid which is likely physiologic.    MUSCULOSKELETAL: Lower ventral abdominal wall subcutaneous soft tissue densities likely related to subcutaneous injections.      Impression    IMPRESSION:   1.  Abnormal dilated gallbladder with associated pericholecystic edema. This is highly suspicious for acute  cholecystitis. Recommend correlation with gallbladder ultrasound.  2.  Hepatic steatosis and mild hepatosplenomegaly.   US Abdomen Limited    Narrative    EXAM: US ABDOMEN LIMITED  LOCATION: [facility]  DATE: 10/30/2024    INDICATION: dilated gallbladder with pericholecystic edema on CT; better assess with ultrasound for acute cholecystitis  COMPARISON: CT abdomen and pelvis 10/30/2024  TECHNIQUE: Limited abdominal ultrasound.    FINDINGS:    GALLBLADDER: Numerous gallstones. Mild diffuse wall thickening measuring 3 mm, with a small amount of pericholecystic fluid.    BILE DUCTS: No biliary dilatation. The common duct measures 3 mm.    LIVER: Hepatomegaly measuring 21.3 cm. Echogenic liver parenchyma consistent with fatty infiltration. No focal lesions. Hepatopedal flow in the portal vein.    RIGHT KIDNEY: No hydronephrosis.    PANCREAS: The visualized portions are normal.    No ascites.      Impression    IMPRESSION:  1.  Gallstones, with wall thickening and pericholecystic fluid compatible with acute cholecystitis.  2.  Hepatomegaly and diffuse fatty infiltration of the liver.       [place] Draw *Canceled*    Narrative    The following orders were created for panel order [place] [address] Draw.  Procedure                               Abnormality         Status                     ---------                               -----------         ------                       Please view results for these tests on the individual orders.   [address] Draw    Narrative    The following orders were created for panel order [address] Draw.  Procedure                               Abnormality         Status                     ---------                               -----------         ------                     Extra Urine Collection[[id number]]                           Final result                 Please view results for these tests on the individual orders.   Extra Urine Collection   Result Value Ref Range    Hold  Specimen JIC

## 2024-10-31 NOTE — ANESTHESIA PREPROCEDURE EVALUATION
Anesthesia Pre-Procedure Evaluation    Patient: Tangela Mac   MRN: [id number] : 1990        Procedure : Procedure(s):  CHOLECYSTECTOMY, LAPAROSCOPIC          History reviewed. No pertinent past medical history.   History reviewed. No pertinent surgical history.   Allergies   Allergen Reactions    Hydrocodone-Acetaminophen Nausea and Vomiting      Social History     Tobacco Use    Smoking status: Not on file    Smokeless tobacco: Not on file   Substance Use Topics    Alcohol use: Not on file      Wt Readings from Last 1 Encounters:   10/30/24 100.1 kg (220 lb 11.2 oz)        Anesthesia Evaluation   Pt has had prior anesthetic.     No history of anesthetic complications       ROS/MED HX  ENT/Pulmonary:  - neg pulmonary ROS     Neurologic:  - neg neurologic ROS     Cardiovascular:     (+)  hypertension- -   -  - -                                      METS/Exercise Tolerance:     Hematologic:  - neg hematologic  ROS     Musculoskeletal:  - neg musculoskeletal ROS     GI/Hepatic:     (+)          cholecystitis/cholelithiasis (Acute cholecystitis),   liver disease (Fatty liver),    (-) GERD   Renal/Genitourinary:  - neg Renal ROS     Endo:     (+)               Obesity (BMI 43),       Psychiatric/Substance Use:     (+) psychiatric history depression and anxiety       Infectious Disease:  - neg infectious disease ROS     Malignancy:  - neg malignancy ROS     Other: Comment: Patient is currently NOT breastfeeding            Physical Exam    Airway  airway exam normal      Mallampati: III   TM distance: > 3 FB   Neck ROM: full   Mouth opening: > 3 cm    Respiratory Devices and Support         Dental  no notable dental history     (+) Minor Abnormalities - some fillings, tiny chips      Cardiovascular   cardiovascular exam normal       Rhythm and rate: regular and normal     Pulmonary   pulmonary exam normal        breath sounds clear to auscultation           OUTSIDE LABS:  CBC:   Lab Results   Component  "Value Date    WBC 9.3 10/30/2024    HGB 11.3 (L) 10/30/2024    HCT 37.3 10/30/2024     10/30/2024     BMP:   Lab Results   Component Value Date     10/30/2024    POTASSIUM 3.5 10/30/2024    CHLORIDE 106 10/30/2024    CO2 22 10/30/2024    BUN 4.7 (L) 10/30/2024    CR 0.68 10/30/2024    GLC 89 10/30/2024     COAGS: No results found for: \"PTT\", \"INR\", \"FIBR\"  POC:   Lab Results   Component Value Date    HCGS Negative 10/30/2024     HEPATIC:   Lab Results   Component Value Date    ALBUMIN 4.1 10/30/2024    PROTTOTAL 7.0 10/30/2024    ALT 60 (H) 10/30/2024    AST 34 10/30/2024    ALKPHOS 127 10/30/2024    BILITOTAL 0.5 10/30/2024     OTHER:   Lab Results   Component Value Date    AMADEO 8.5 (L) 10/30/2024    LIPASE 14 10/30/2024       Anesthesia Plan    ASA Status:  3    NPO Status:  NPO Appropriate    Anesthesia Type: General.     - Airway: ETT   Induction: Intravenous, Propofol.   Maintenance: TIVA.   Techniques and Equipment:     - Airway: Video-Laryngoscope       Consents    Anesthesia Plan(s) and associated risks, benefits, and realistic alternatives discussed. Questions answered and patient/representative(s) expressed understanding.     - Discussed:     - Discussed with:  Patient            Postoperative Care    Pain management: IV analgesics, Oral pain medications, Multi-modal analgesia.   PONV prophylaxis: Ondansetron (or other 5HT-3), Dexamethasone or Solumedrol, Droperidol or Haldol     Comments:               Janene Hermosillo MD    I have reviewed the pertinent notes and labs in the chart from the past 30 days and (re)examined the patient.  Any updates or changes from those notes are reflected in this note.       # Hypocalcemia: Lowest Ca = 8.5 mg/dL in last 2 days, will monitor and replace as appropriate         # Hypertension: Noted on problem list         # Severe Obesity: Estimated body mass index is 43.1 kg/m  as calculated from the following:    Height as of this encounter: 1.524 m (5').    " Weight as of this encounter: 100.1 kg (220 lb 11.2 oz).

## 2024-10-31 NOTE — ED NOTES
[facility] ED Handoff Report    ED Chief Complaint: Abdominal pain/cholecystitis    ED Diagnosis:  (K81.0) Acute cholecystitis  Comment:   Plan: Pain/nausea controlled with medications       PMH:  No past medical history on file.     Code Status:  No Order     Falls Risk: No Band: Not applicable    Current Living Situation/Residence: lives with a significant other     Elimination Status: Continent: Yes     Activity Level: Independent    Patients Preferred Language:  English     Needed: No    Vital Signs:  /56   Pulse 83   Temp 98.2  F (36.8  C) (Oral)   Resp 18   Ht 1.524 m (5')   Wt 100.1 kg (220 lb 11.2 oz)   SpO2 97%   BMI 43.10 kg/m       Cardiac Rhythm: NSR    Pain Score: 2/10    Is the Patient Confused:  No    Last Food or Drink: 10/30/24 at     Focused Assessment:  Gastrointestinal    Tests Performed: Done: Labs and Imaging    Treatments Provided:  fluids and medications    Family Dynamics/Concerns: No    Family Updated On Visitor Policy: Yes    Plan of Care Communicated to Family: Yes    Who Was Updated about Plan of Care: patient    Belongings Checklist Done and Signed by Patient: No    Belongings Sent with Patient:       Covid: asymptomatic , not tested    Additional Information:     RN: Criss Kramer RN   10/30/2024 7:56 PM

## 2024-10-31 NOTE — CONSULTS
Consultation - Surgery  Tangela Mac,  1990, MRN [id number]    Admitting Dx: Acute cholecystitis [K81.0]    PCP: No Ref-Primary, Physician, None   Code status:  Full Code       Extended Emergency Contact Information  Primary Emergency Contact: Augusto Mac  Mobile Phone: [phone]  Relation: Spouse       Assessment and Plan   Impression:  Acute cholecystitis.  Recommend laparoscopic cholecystectomy.  Patient understands risk and benefits of surgery and wishes to proceed.      Plan:  Laparoscopic cholecystectomy.  If surgery goes well the patient should be able to be discharged home today.           Chief Complaint Acute cholecystitis       HPI    We have been requested by the hospitalist service to evaluate Tangela Mac for acute cholecystitis.  This is a 34 year old year old female who is recently postpartum who comes in with a 3-day history of abdominal pain.  Becoming much worse.  Very painful in the right upper quadrant.  Ultrasound and CT scan show cholelithiasis with gallbladder wall thickening and pericholecystic fluid.       Medical History  Patient Active Problem List   Diagnosis    Acute cholecystitis    Moderate recurrent major depression (H)    Pregnancy induced hypertension, postpartum    Fatty liver       [unfilled] Surgical History  History reviewed. No pertinent surgical history.     Social History       Allergies  Allergies   Allergen Reactions    Hydrocodone-Acetaminophen Nausea and Vomiting    Family History  Reviewed, and family history is not on file.  The Family history is not pertinent to the patients chief complaint. Psychosocial Needs  Social History     Social History Narrative    Not on file     Additional psychosocial needs reviewed per nursing assessment.     Prior to Admission Medications   Current Outpatient Medications   Medication Instructions    FLUoxetine (PROZAC) 20 mg, DAILY           Review of Systems:  Pertinent items are noted in HPI.  Physical Exam:  Temp:  [98.2  F (36.8  C)-99.2  F (37.3  C)] 98.3  F (36.8  C)  Pulse:  [77-88] 83  Resp:  [16-20] 20  BP: (123-148)/(56-78) 140/73  SpO2:  [96 %-99 %] 96 %    General appearance: alert, appears stated age, and cooperative  Eyes: No scleral icterus  Lungs: Clear  Heart: Regular  Abdomen: Soft.  Quite tender in the right upper quadrant with guarding.  Extremities: No abnormalities noted  Pulses: 2+ and symmetric  Skin: Skin color, texture, turgor normal. No rashes or lesions  Neurologic: Grossly normal       Pertinent Labs  Lab Results: personally reviewed.   Lab Results   Component Value Date    WBC 7.6 10/31/2024    HGB 10.3 10/31/2024    HCT 33.5 10/31/2024    MCV 85 10/31/2024     10/31/2024     FTs are normal   Pertinent Radiology  Radiology Results: Personally reviewed image/s and Personally reviewed impression/s  EKG Results: not reviewed.

## 2024-10-31 NOTE — DISCHARGE INSTRUCTIONS
From your Surgeon:    Follow up:  For straightforward laparoscopic procedures, our practice is to check-in with you over the phone a few days after your procedure.  If you would like a scheduled follow up appointment in clinic, please call us at [phone] to schedule an appointment at your convenience.  If you would prefer to follow up with us further by phone, please let us know so that we may contact you 2-3 weeks following your procedure.        Diet: Regular diet. Patients can have difficulty with constipation following surgery, due in part to the administration of narcotic pain medications.  If you are suffering with constipation, you should avoid foods such as hard cheeses or red meat.  Foods high in fiber are recommended.      Activity: You should continue to be active at home, including getting up and walking frequently.  If possible, limit the amount of time spent in bed.    Restrictions: You should not lift greater than 20 pounds for 2 weeks, and will want to avoid strenuous physical activity for 1-2 weeks.  You should limit your physical activity if it causes you discomfort; however, this should resolve within 1-2 weeks.   Walking does not count as strenuous physical activity and you are safe to walk up and down stairs.  Following 2 weeks you may resume normal physical activity.    Work:  You can return to work once your surgical pain has resolved.  If you perform duties that require lifting, pushing or pulling anything greater than 15 pounds, you should be on light duty for the immediate 2 weeks after your surgery (if your work allows light duty).  After 2 weeks, you can return to work without restrictions.    Wound care: You may remove your Band-aids after 24 hours. The small white strips on the incisions act like artificial scabs, and will begin to peel at the edges at around 5-7 days. These can then be removed.  It is normal to have a small rim of red present around the incisions. This should not,  however, extend beyond 1/4 inch from the incision.  If your incisions become increasingly tender, red, or draining, please contact us.       Bathing: You may shower after 24 hours from surgery.  It is ok to get your incisions wet, but avoid rubbing them.  Avoid soaking in bath tubs or swimming in lakes, pools, or streams for 2 weeks following surgery.     PAIN:  Some pain is expected after surgery. This will improve over time. After laparoscopic surgery, some people experience shoulder pain on the first day after surgery. This is from the gas used to inflate the abdomen during the surgery. This sensation usually improves within 48 hours. I recommend using Tylenol 1000 mg every 6 hours and ibuprofen 400-600 mg every 6 hours for your primary pain control. Alternate between the two medications, taking one every three hours. Example schedule below.    9 AM  - Tylenol 1000 mg   12 PM - Ibuprofen 400-600 mg  3 PM - Tylenol 1000 mg   6 PM - Ibuprofen 400-600 mg    Take these medications scheduled for 3 days. Do not exceed the maximum dosage amount over 24 hours as recommended on the medication bottle.   Take the prescribed narcotic medications only if needed in addition to medications above.     Narcotic medications can cause constipation. If you are struggling with constipation, we recommend taking Miralax once daily or stool softener combo (such as Dulcolax or Senna) to help easily pass stool. Do not take these medications if you are having diarrhea or are sensitive / allergic to them.

## 2024-10-31 NOTE — PHARMACY-ADMISSION MEDICATION HISTORY
Pharmacist Admission Medication History    Admission medication history is complete. The information provided in this note is only as accurate as the sources available at the time of the update.    Information Source(s): Patient and CareEverywhere/SureScripts via in-person    Pertinent Information:     Changes made to PTA medication list:  Added: fluoxetine  Deleted: None  Changed: None    Allergies reviewed with patient and updates made in EHR: yes    Medication History Completed By: Jessica Thomson RPH 10/30/2024 8:12 PM    PTA Med List   Medication Sig Last Dose/Taking    FLUoxetine (PROZAC) 20 MG capsule Take 20 mg by mouth daily. 10/29/2024 Noon

## 2024-10-31 NOTE — ED NOTES
Patient aware urine sample still needed. Had voided on way back from US.   locker 2/family member/on unit

## 2024-10-31 NOTE — ED PROVIDER NOTES
I, Jo-Ann Barajas have reviewed the documentation, personally taken the patient's history, performed an exam and agree with the physical finds, diagnosis and management plan.    HPI: 34-year-old female with 6wk post partum 9/19. Hosp for endometritis/pyelonephritis. Now upper abd pain starting left now entire upper abdomen. Fevers.     Physical Exam: Well-appearing no acute distress ttp entire upper abdomen, greatest in the right upper quadrant on my exam    MDM: GERD, gastritis, pancreatitis, hepatobiliary pathology and less likely pyelonephritis or ureterolithiasis    ED Course/workup: Laboratory workup reassuring but on CT has evidence of pericholecystic edema with dilated gallbladder consistent with acute cholecystitis which frankly fits clinically, pending ultrasound patient was cover with [facility] General Surgery consulted and admitted for likely anticipated operative management      ED Course as of 10/30/24 2012   Wed Oct 30, 2024   1607 I met and introduced myself to the patient. I gathered initial history and performed my physical exam.  On exam, patient is well-appearing and in no acute distress.  There is abdominal tenderness to epigastrium and RUQ and LUQ.  No CVA tenderness bilaterally.  Plan for belly labs, urine testing, CT abdomen and pelvis.   1706 CBC with platelets differential(!)  CBC without leukocytosis or significant anemia, hemoglobin stable.   1706 Basic metabolic panel(!)  BMP without hypo/hyperglycemia, metabolic acidosis, and emergent electrolyte derangement. Kidney function maintained.   1706 Lipase: 14  Doubt pancreatitis.   1706 Troponin T, High Sensitivity: <6  ACS ruled out per troponin algorithm.   1706 ALT(!): 60  Mild increase. 56 on 09/27/2024.   1706 Hepatic function panel(!)  No significant elevation is liver transaminases. Bilirubin WNL.   1810 HCG Qualitative Serum: Negative  Doubt pregnancy and pregnancy related complications.   1823 I reassessed patient who feels significantly  improved after IV Toradol. Going to CT.    1900 CT Abdomen Pelvis w Contrast  Dilated gallbladder with pericholecystic edema suspicious for acute cholecystitis, plan for RUQ US   1903 Updated patient on findings.   1919 I have staffed the patient with Dr. Jo-Ann Barajas ED MD, who has evaluated the patient and agrees with all aspects of today's care. Zosyn started.   1921 Placed call to hospitalist for admission pending RUQ US. Spoke to general surgery in the ED who would like us to admit the patient as they do not have OR time for a gallbladder tonight.    1951 Spoke to MD Richardson who accepts patient for admission.          Final Diagnosis:     ICD-10-CM    1. Acute cholecystitis  K81.0             I personally saw the patient and performed a substantive portion of the visit including all aspects of the medical decision making.  I personally made/approved the management plan and take responsibility for the patient management.     MD Karl Arevalo Zabrina N, MD  10/30/24 2012

## 2024-10-31 NOTE — DISCHARGE SUMMARY
[facility]  Hospitalist Discharge Summary      Date of Admission:  10/30/2024  Date of Discharge:  10/31/2024 12:40 PM  Discharging Provider: Av Edwards MD  Discharge Service: Hospitalist Service    Discharge Diagnoses   Acute cholecystitis    Clinically Significant Risk Factors     # Severe Obesity: Estimated body mass index is 43.1 kg/m  as calculated from the following:    Height as of this encounter: 1.524 m (5').    Weight as of this encounter: 100.1 kg (220 lb 11.2 oz).       Follow-ups Needed After Discharge   Unresulted Labs Ordered in the Past 30 Days of this Admission       Date and Time Order Name Status Description    10/31/2024  9:19 AM Surgical Pathology Exam In process         These results will be followed up by surgery service.    Discharge Disposition   Discharged to home  Condition at discharge: Stable    Hospital Course   Tangela Mac is a 34-year-old woman with history of depression, and pregnancy-induced hypertension admitted on 10/30/2024 with intractable abdominal pain secondary to acute cholecystitis.    CT scan showed abnormal dilated gallbladder with associated pericholecystic edema concerning for acute cholecystitis and hepatic steatosis with mild hepatosplenomegaly.  Abdominal ultrasound showed gallstones, with wall thickening and pericholecystic fluid compatible with acute cholecystitis and hepatomegaly with diffuse fatty infiltration of the liver.  She received IV Zosyn briefly and was subsequently underwent laparoscopic cholecystectomy on 10/31/2024.  Notable operative findings include acutely inflamed gallbladder with stones in the neck of the gallbladder.  Symptoms have improved significantly and patient was discharged home directly from PACU.        Consultations This Hospital Stay   SURGERY GENERAL IP CONSULT    Code Status   Prior    Time Spent on this Encounter   I, Av Edwards MD, personally saw the patient today and spent  greater than 30 minutes discharging this patient.       Av Edwards MD  [facility] PHASE II  [address] [place] [id number]  Phone: [phone]  Fax: [phone]  ______________________________________________________________________    Physical Exam   Vital Signs: Temp: 97.7  F (36.5  C) Temp src: Temporal BP: 116/61 Pulse: 82   Resp: 15 SpO2: 95 % O2 Device: None (Room air) Oxygen Delivery: 2 LPM  Weight: 220 lbs 11.2 oz      General appearance: Awake, Alert, Cooperative, not in any obvious distress and appears stated age   HEENT: Normocephalic, atraumatic, conjunctiva clear without icterus and ears without discharge  Lungs: Clear to auscultation bilaterally, no wheezing, good air exchange, normal work of breathing  Cardiovascular: Regular Rate and Rythm, normal apical impulse, normal S1 and S2, no lower extremity edema bilaterally  Abdomen: Soft, mild right upper quadrant tenderness, non-distended, active bowel sounds  Skin: Skin color, texture normal and bruising or bleeding. No rashes or lesions over face, neck, arms and legs, turgor normal.  Musculoskeletal: No bony deformities or joint tenderness. Normal ROM upon flexion & extension.   Neurologic: Alert & Oriented X 3, Facial symmetry preserved and upper & lower extremities moving well with symmetry  Psychiatric: Calm, normal eye contact and normal affect         Primary Care Physician   Physician No Ref-Primary    Discharge Orders      When to call - Contact Surgeon Team    You may experience symptoms that require follow-up before your scheduled appointment. Contact your Surgeon Team if you are concerned about pain control, large amount of bleeding, blood clots, constipation, or if you experience signs of infection (fever, growing tenderness at the surgery site, a large amount of drainage, severe pain, foul-smelling drainage, redness or swelling.  Please call [phone] if you do have any questions or concerns      When to call - Reasons to Call 911    Call 911 immediately if you experience sudden-onset chest pain, arm weakness/numbness, slurred speech, or shortness of breath     Symptoms - Fever Management    A low grade fever can be expected after surgery. Your Provider many have prescribed an Opioid pain medication that also contains acetaminophen (TYLENOL) that may help with Fever management.  Do NOT take additional acetaminophen (TYLENOL) in combination with an Opioid/acetaminophen (TYLENOL) product. Read the labels on your Over The Counter (OTC) medications with care.     Diet Instructions    You may drink clear liquids (apple juice, ginger ale, 7-up, broth, etc.), and progress to your regular diet as you feel able. It is important to stay well-hydrated after surgery and drink plenty of water.     Shower/Bathing - Restrictions: Let water run over incisions and pat dry. No tub baths until bleeding stops.    Shower/Bathing - Restrictions: Let water run over incisions and pat dry. Do NOT soak in any body of water (lake, pool, bath, etc.) for 7-10 days postoperatively.     Dressing / Wound Care - Wound    Remove Band-Aids in 1 to 2 days.  Leave Steri-Strips for 7 to 10 days.  The sutures are underneath the skin and will dissolve on their own.     Discharge Instructions - Comfort and Pain Management    Pain after surgery is normal and expected. You will have some amount of pain after surgery. Your pain will improve with time. There are several things you can do to help reduce your pain including: rest, ice, and using pain medications as needed. Use pain interventions and don't wait until pain level is out of control. Contact your Surgeon Team if you have pain that persists or worsens after surgery despite rest, ice, and taking your medication(s) as prescribed. You may have a dry mouth, a sore throat, muscles aches or trouble sleeping, and these symptoms should go away after 24 hours.     Discharge Instructions - Rest     Rest and relax for the next 24 hours. Make arrangements to have someone stay with you overnight, and avoid hazardous and strenuous activities.  Do NOT make any important decisions for the next 24 hours.     Return to normal activity as tolerated    Return to normal activity as tolerated     May return to work (WITHOUT restrictions)    May return to work as you feel up to it.  Everyone is different.  Average time off work is approximately 1 week.       Significant Results and Procedures   Results for orders placed or performed during the hospital encounter of 10/30/24   CT Abdomen Pelvis w Contrast    Narrative    EXAM: CT ABDOMEN PELVIS W CONTRAST  LOCATION: [facility]  DATE: 10/30/2024    INDICATION: Upper abdominal pain with nausea and vomiting radiating to the back,  on   COMPARISON: None.  TECHNIQUE: CT scan of the abdomen and pelvis was performed following injection of IV contrast. Multiplanar reformats were obtained. Dose reduction techniques were used.  CONTRAST: 90 mL Isovue 370    FINDINGS:   LOWER CHEST: Small likely reactive paraesophageal, pericardial, cardiophrenic recess and pericaval lymph nodes.    HEPATOBILIARY: Mild hepatomegaly. Hepatic steatosis. Mild gallbladder distention with associated moderate pericholecystic edema. No radiodense gallstone. No biliary ductal dilatation.    PANCREAS: Normal.    SPLEEN: Normal.    ADRENAL GLANDS: Normal.    KIDNEYS/BLADDER: Normal.    BOWEL: No bowel obstruction or inflammatory process. Nonvisualized appendix. Mild colonic stool burden. Trace pelvic free fluid. No free air.    LYMPH NODES: Normal.    VASCULATURE: Normal. Patent central SMA and SMV.    PELVIC ORGANS: Right adnexal 2.5 cm cystic structure, likely a dominant ovarian follicle. Trace pelvic free fluid which is likely physiologic.    MUSCULOSKELETAL: Lower ventral abdominal wall subcutaneous soft tissue densities likely related to subcutaneous injections.       Impression    IMPRESSION:   1.  Abnormal dilated gallbladder with associated pericholecystic edema. This is highly suspicious for acute cholecystitis. Recommend correlation with gallbladder ultrasound.  2.  Hepatic steatosis and mild hepatosplenomegaly.   US Abdomen Limited    Narrative    EXAM: US ABDOMEN LIMITED  LOCATION: [facility]  DATE: 10/30/2024    INDICATION: dilated gallbladder with pericholecystic edema on CT; better assess with ultrasound for acute cholecystitis  COMPARISON: CT abdomen and pelvis 10/30/2024  TECHNIQUE: Limited abdominal ultrasound.    FINDINGS:    GALLBLADDER: Numerous gallstones. Mild diffuse wall thickening measuring 3 mm, with a small amount of pericholecystic fluid.    BILE DUCTS: No biliary dilatation. The common duct measures 3 mm.    LIVER: Hepatomegaly measuring 21.3 cm. Echogenic liver parenchyma consistent with fatty infiltration. No focal lesions. Hepatopedal flow in the portal vein.    RIGHT KIDNEY: No hydronephrosis.    PANCREAS: The visualized portions are normal.    No ascites.      Impression    IMPRESSION:  1.  Gallstones, with wall thickening and pericholecystic fluid compatible with acute cholecystitis.  2.  Hepatomegaly and diffuse fatty infiltration of the liver.           Discharge Medications   Discharge Medication List as of 10/31/2024 12:19 PM        START taking these medications    Details   oxyCODONE (ROXICODONE) 5 MG tablet Take 1 tablet (5 mg) by mouth every 4 hours as needed for moderate to severe pain., Disp-12 tablet, R-0, E-Prescribe           CONTINUE these medications which have NOT CHANGED    Details   FLUoxetine (PROZAC) 20 MG capsule Take 20 mg by mouth daily., Historical           Allergies   Allergies   Allergen Reactions    Hydrocodone-Acetaminophen Nausea and Vomiting

## 2024-10-31 NOTE — ANESTHESIA CARE TRANSFER NOTE
Patient: Tangela Mac    Procedure: Procedure(s):  CHOLECYSTECTOMY, LAPAROSCOPIC       Diagnosis: Acute cholecystitis [K81.0]  Diagnosis Additional Information: No value filed.    Anesthesia Type:   General     Note:    Oropharynx: oropharynx clear of all foreign objects and spontaneously breathing  Level of Consciousness: drowsy  Oxygen Supplementation: face mask    Independent Airway: airway patency satisfactory and stable  Dentition: dentition unchanged  Vital Signs Stable: post-procedure vital signs reviewed and stable  Report to RN Given: handoff report given  Patient transferred to: PACU    Handoff Report: Identifed the Patient, Identified the Reponsible Provider, Reviewed the pertinent medical history, Discussed the surgical course, Reviewed Intra-OP anesthesia mangement and issues during anesthesia, Set expectations for post-procedure period and Allowed opportunity for questions and acknowledgement of understanding      Vitals:  Vitals Value Taken Time   /61 10/31/24 0953   Temp 36.4  C (97.52  F) 10/31/24 0956   Pulse 86 10/31/24 0956   Resp 18 10/31/24 0956   SpO2 96 % 10/31/24 0956   Vitals shown include unfiled device data.    Electronically Signed By: MARY Barraza CRNA  October 31, 2024  9:58 AM

## 2024-10-31 NOTE — ANESTHESIA PROCEDURE NOTES
Airway       Patient location during procedure: OR       Procedure Start/Stop Times: 10/31/2024 8:47 AM  Staff -        CRNA: Shahriar Mendes APRN CRNA       Performed By: CRNA  Consent for Airway        Urgency: elective  Indications and Patient Condition       Indications for airway management: arnold-procedural       Induction type:intravenous       Mask difficulty assessment: 1 - vent by mask    Final Airway Details       Final airway type: endotracheal airway       Successful airway: ETT - single  Endotracheal Airway Details        ETT size (mm): 7.0       Cuffed: yes       Successful intubation technique: video laryngoscopy       VL Blade Size: Glidescope 3       Grade View of Cords: 1       Adjucts: stylet       Position: Right       Measured from: lips       Secured at (cm): 23       Bite block used: None    Post intubation assessment        Placement verified by: capnometry, equal breath sounds and chest rise        Number of attempts at approach: 1       Number of other approaches attempted: 0       Secured with: tape       Ease of procedure: easy       Dentition: Intact and Unchanged    Medication(s) Administered   Medication Administration Time: 10/31/2024 8:47 AM

## 2024-10-31 NOTE — PLAN OF CARE
Problem: Adult Inpatient Plan of Care  Goal: Optimal Comfort and Wellbeing  Outcome: Progressing  Intervention: Monitor Pain and Promote Comfort  Recent Flowsheet Documentation  Taken 10/31/2024 0402 by Slim Smith RN  Pain Management Interventions: medication (see MAR)   Goal Outcome Evaluation:       A&O x 4. Ambulates independently. Admitted for acute cholecystitis. PIV access left antecubital space running continuous NS @100mL/hr. Pt experiencing moderate pain controlled by PRN oxycodone 5mg tablet. Surgeon expected to see patient 10/31/2024 to discuss surgery time. Bed in low position, call light within reach. Non-slip footwear in use. Patient calls appropriately.   Visit Vitals  /74 (Patient Position: Supine)   Pulse 78   Temp 98.7  F (37.1  C) (Oral)   Resp 18

## 2024-11-01 LAB
PATH REPORT.COMMENTS IMP SPEC: NORMAL
PATH REPORT.COMMENTS IMP SPEC: NORMAL
PATH REPORT.FINAL DX SPEC: NORMAL
PATH REPORT.GROSS SPEC: NORMAL
PATH REPORT.MICROSCOPIC SPEC OTHER STN: NORMAL
PATH REPORT.RELEVANT HX SPEC: NORMAL
PHOTO IMAGE: NORMAL

## 2024-11-01 PROCEDURE — 88304 TISSUE EXAM BY PATHOLOGIST: CPT | Mod: 26 | Performed by: PATHOLOGY

## 2024-11-04 ENCOUNTER — TELEPHONE (OUTPATIENT)
Dept: SURGERY | Facility: CLINIC | Age: 34
End: 2024-11-04
Payer: COMMERCIAL

## 2024-11-04 NOTE — TELEPHONE ENCOUNTER
[facility] Post-Op Phone Call                     Surgeon: Kellen Willard    Date of Surgery: 10/31/24  Surgery: Laparoscopic Cholecystectomy  Discharge Date: 10/31/24    Date/Time Called:   Date: 11/4/2024 Time: 2:23 PM   Attempt: First    Attempted to contact patient to check on them post-operatively. Left a voicemail to call back with any questions or concerns.       Thank you for your time. Please do not hesitate to call us with any questions or concerns.    Call completed by: Kiah Marroquin RN

## 2024-11-05 RX ORDER — FENTANYL CITRATE 50 UG/ML
INJECTION, SOLUTION INTRAMUSCULAR; INTRAVENOUS PRN
Status: DISCONTINUED | OUTPATIENT
Start: 2024-10-31 | End: 2024-11-05

## 2024-11-05 RX ORDER — EPHEDRINE SULFATE 50 MG/ML
INJECTION, SOLUTION INTRAMUSCULAR; INTRAVENOUS; SUBCUTANEOUS PRN
Status: DISCONTINUED | OUTPATIENT
Start: 2024-10-31 | End: 2024-11-05

## 2024-11-11 NOTE — ADDENDUM NOTE
Addendum  created 11/11/24 1739 by Julian Rod MD    Attestation recorded in Intraprocedure, Intraprocedure Attestations filed

## 2024-12-14 ENCOUNTER — HEALTH MAINTENANCE LETTER (OUTPATIENT)
Age: 34
End: 2024-12-14

## 2024-12-17 ENCOUNTER — ANESTHESIA EVENT (OUTPATIENT)
Dept: SURGERY | Facility: AMBULATORY SURGERY CENTER | Age: 34
End: 2024-12-17
Payer: COMMERCIAL

## 2024-12-18 ENCOUNTER — ANESTHESIA (OUTPATIENT)
Dept: SURGERY | Facility: AMBULATORY SURGERY CENTER | Age: 34
End: 2024-12-18
Payer: COMMERCIAL

## 2024-12-18 ENCOUNTER — HOSPITAL ENCOUNTER (OUTPATIENT)
Facility: AMBULATORY SURGERY CENTER | Age: 34
Discharge: HOME | End: 2024-12-18
Attending: OBSTETRICS & GYNECOLOGY
Payer: COMMERCIAL

## 2024-12-18 VITALS
RESPIRATION RATE: 16 BRPM | SYSTOLIC BLOOD PRESSURE: 105 MMHG | OXYGEN SATURATION: 95 % | HEIGHT: 61 IN | BODY MASS INDEX: 42.67 KG/M2 | TEMPERATURE: 97 F | DIASTOLIC BLOOD PRESSURE: 53 MMHG | WEIGHT: 226 LBS | HEART RATE: 65 BPM

## 2024-12-18 DIAGNOSIS — K81.0 ACUTE CHOLECYSTITIS: ICD-10-CM

## 2024-12-18 DIAGNOSIS — G89.18 POSTOPERATIVE PAIN: Primary | ICD-10-CM

## 2024-12-18 DIAGNOSIS — Z30.2 ENCOUNTER FOR STERILIZATION: ICD-10-CM

## 2024-12-18 LAB
HCG UR QL: NEGATIVE
INTERNAL QC OK POCT: NORMAL
POCT KIT EXPIRATION DATE: NORMAL
POCT KIT LOT NUMBER: NORMAL

## 2024-12-18 RX ORDER — HYDROMORPHONE HCL IN WATER/PF 6 MG/30 ML
0.2 PATIENT CONTROLLED ANALGESIA SYRINGE INTRAVENOUS EVERY 5 MIN PRN
Status: DISCONTINUED | OUTPATIENT
Start: 2024-12-18 | End: 2024-12-19 | Stop reason: HOSPADM

## 2024-12-18 RX ORDER — FENTANYL CITRATE 50 UG/ML
INJECTION, SOLUTION INTRAMUSCULAR; INTRAVENOUS PRN
Status: DISCONTINUED | OUTPATIENT
Start: 2024-12-18 | End: 2024-12-18

## 2024-12-18 RX ORDER — NALOXONE HYDROCHLORIDE 0.4 MG/ML
0.1 INJECTION, SOLUTION INTRAMUSCULAR; INTRAVENOUS; SUBCUTANEOUS
Status: DISCONTINUED | OUTPATIENT
Start: 2024-12-18 | End: 2024-12-19 | Stop reason: HOSPADM

## 2024-12-18 RX ORDER — ONDANSETRON 4 MG/1
4 TABLET, ORALLY DISINTEGRATING ORAL EVERY 30 MIN PRN
Status: DISCONTINUED | OUTPATIENT
Start: 2024-12-18 | End: 2024-12-19 | Stop reason: HOSPADM

## 2024-12-18 RX ORDER — LIDOCAINE 40 MG/G
CREAM TOPICAL
Status: DISCONTINUED | OUTPATIENT
Start: 2024-12-18 | End: 2024-12-19 | Stop reason: HOSPADM

## 2024-12-18 RX ORDER — PROPOFOL 10 MG/ML
INJECTION, EMULSION INTRAVENOUS PRN
Status: DISCONTINUED | OUTPATIENT
Start: 2024-12-18 | End: 2024-12-18

## 2024-12-18 RX ORDER — OXYCODONE HYDROCHLORIDE 5 MG/1
5 TABLET ORAL
Status: DISCONTINUED | OUTPATIENT
Start: 2024-12-18 | End: 2024-12-19 | Stop reason: HOSPADM

## 2024-12-18 RX ORDER — PROPOFOL 10 MG/ML
INJECTION, EMULSION INTRAVENOUS CONTINUOUS PRN
Status: DISCONTINUED | OUTPATIENT
Start: 2024-12-18 | End: 2024-12-18

## 2024-12-18 RX ORDER — OXYCODONE HYDROCHLORIDE 5 MG/1
5 TABLET ORAL
Status: COMPLETED | OUTPATIENT
Start: 2024-12-18 | End: 2024-12-18

## 2024-12-18 RX ORDER — ONDANSETRON 2 MG/ML
4 INJECTION INTRAMUSCULAR; INTRAVENOUS EVERY 30 MIN PRN
Status: DISCONTINUED | OUTPATIENT
Start: 2024-12-18 | End: 2024-12-19 | Stop reason: HOSPADM

## 2024-12-18 RX ORDER — OXYCODONE HYDROCHLORIDE 5 MG/1
5-10 TABLET ORAL EVERY 4 HOURS PRN
Qty: 12 TABLET | Refills: 0 | Status: SHIPPED | OUTPATIENT
Start: 2024-12-18

## 2024-12-18 RX ORDER — IBUPROFEN 800 MG/1
800 TABLET, FILM COATED ORAL ONCE
Status: DISCONTINUED | OUTPATIENT
Start: 2024-12-18 | End: 2024-12-19 | Stop reason: HOSPADM

## 2024-12-18 RX ORDER — DEXAMETHASONE SODIUM PHOSPHATE 4 MG/ML
INJECTION, SOLUTION INTRA-ARTICULAR; INTRALESIONAL; INTRAMUSCULAR; INTRAVENOUS; SOFT TISSUE PRN
Status: DISCONTINUED | OUTPATIENT
Start: 2024-12-18 | End: 2024-12-18

## 2024-12-18 RX ORDER — MEPERIDINE HYDROCHLORIDE 25 MG/ML
12.5 INJECTION INTRAMUSCULAR; INTRAVENOUS; SUBCUTANEOUS EVERY 5 MIN PRN
Status: DISCONTINUED | OUTPATIENT
Start: 2024-12-18 | End: 2024-12-19 | Stop reason: HOSPADM

## 2024-12-18 RX ORDER — HYDROMORPHONE HCL IN WATER/PF 6 MG/30 ML
0.4 PATIENT CONTROLLED ANALGESIA SYRINGE INTRAVENOUS EVERY 5 MIN PRN
Status: DISCONTINUED | OUTPATIENT
Start: 2024-12-18 | End: 2024-12-19 | Stop reason: HOSPADM

## 2024-12-18 RX ORDER — FENTANYL CITRATE 0.05 MG/ML
25 INJECTION, SOLUTION INTRAMUSCULAR; INTRAVENOUS
Status: DISCONTINUED | OUTPATIENT
Start: 2024-12-18 | End: 2024-12-19 | Stop reason: HOSPADM

## 2024-12-18 RX ORDER — ACETAMINOPHEN 325 MG/1
975 TABLET ORAL ONCE
Status: DISCONTINUED | OUTPATIENT
Start: 2024-12-18 | End: 2024-12-19 | Stop reason: HOSPADM

## 2024-12-18 RX ORDER — MAGNESIUM SULFATE HEPTAHYDRATE 40 MG/ML
4 INJECTION, SOLUTION INTRAVENOUS ONCE
Status: COMPLETED | OUTPATIENT
Start: 2024-12-18 | End: 2024-12-18

## 2024-12-18 RX ORDER — BUPIVACAINE HYDROCHLORIDE 2.5 MG/ML
INJECTION, SOLUTION INFILTRATION; PERINEURAL PRN
Status: DISCONTINUED | OUTPATIENT
Start: 2024-12-18 | End: 2024-12-18 | Stop reason: HOSPADM

## 2024-12-18 RX ORDER — SODIUM CHLORIDE, SODIUM LACTATE, POTASSIUM CHLORIDE, CALCIUM CHLORIDE 600; 310; 30; 20 MG/100ML; MG/100ML; MG/100ML; MG/100ML
INJECTION, SOLUTION INTRAVENOUS CONTINUOUS
Status: DISCONTINUED | OUTPATIENT
Start: 2024-12-18 | End: 2024-12-19 | Stop reason: HOSPADM

## 2024-12-18 RX ORDER — LIDOCAINE HYDROCHLORIDE 20 MG/ML
INJECTION, SOLUTION INFILTRATION; PERINEURAL PRN
Status: DISCONTINUED | OUTPATIENT
Start: 2024-12-18 | End: 2024-12-18

## 2024-12-18 RX ORDER — FENTANYL CITRATE 0.05 MG/ML
25 INJECTION, SOLUTION INTRAMUSCULAR; INTRAVENOUS EVERY 5 MIN PRN
Status: DISCONTINUED | OUTPATIENT
Start: 2024-12-18 | End: 2024-12-19 | Stop reason: HOSPADM

## 2024-12-18 RX ORDER — ONDANSETRON 2 MG/ML
INJECTION INTRAMUSCULAR; INTRAVENOUS PRN
Status: DISCONTINUED | OUTPATIENT
Start: 2024-12-18 | End: 2024-12-18

## 2024-12-18 RX ORDER — ACETAMINOPHEN 325 MG/1
975 TABLET ORAL ONCE
Status: COMPLETED | OUTPATIENT
Start: 2024-12-18 | End: 2024-12-18

## 2024-12-18 RX ORDER — FENTANYL CITRATE 0.05 MG/ML
50 INJECTION, SOLUTION INTRAMUSCULAR; INTRAVENOUS EVERY 5 MIN PRN
Status: DISCONTINUED | OUTPATIENT
Start: 2024-12-18 | End: 2024-12-19 | Stop reason: HOSPADM

## 2024-12-18 RX ORDER — OXYCODONE HYDROCHLORIDE 10 MG/1
10 TABLET ORAL
Status: DISCONTINUED | OUTPATIENT
Start: 2024-12-18 | End: 2024-12-19 | Stop reason: HOSPADM

## 2024-12-18 RX ADMIN — FENTANYL CITRATE 100 MCG: 50 INJECTION, SOLUTION INTRAMUSCULAR; INTRAVENOUS at 08:34

## 2024-12-18 RX ADMIN — ACETAMINOPHEN 975 MG: 325 TABLET ORAL at 07:36

## 2024-12-18 RX ADMIN — FENTANYL CITRATE 50 MCG: 0.05 INJECTION, SOLUTION INTRAMUSCULAR; INTRAVENOUS at 09:40

## 2024-12-18 RX ADMIN — LIDOCAINE HYDROCHLORIDE 3 ML: 20 INJECTION, SOLUTION INFILTRATION; PERINEURAL at 08:34

## 2024-12-18 RX ADMIN — Medication 30 MG: at 08:34

## 2024-12-18 RX ADMIN — MAGNESIUM SULFATE HEPTAHYDRATE 4 G: 40 INJECTION, SOLUTION INTRAVENOUS at 07:54

## 2024-12-18 RX ADMIN — OXYCODONE HYDROCHLORIDE 5 MG: 5 TABLET ORAL at 10:12

## 2024-12-18 RX ADMIN — ONDANSETRON 4 MG: 2 INJECTION INTRAMUSCULAR; INTRAVENOUS at 09:09

## 2024-12-18 RX ADMIN — PROPOFOL 180 MCG/KG/MIN: 10 INJECTION, EMULSION INTRAVENOUS at 08:34

## 2024-12-18 RX ADMIN — PROPOFOL 200 MG: 10 INJECTION, EMULSION INTRAVENOUS at 08:34

## 2024-12-18 RX ADMIN — DEXAMETHASONE SODIUM PHOSPHATE 8 MG: 4 INJECTION, SOLUTION INTRA-ARTICULAR; INTRALESIONAL; INTRAMUSCULAR; INTRAVENOUS; SOFT TISSUE at 08:46

## 2024-12-18 RX ADMIN — SODIUM CHLORIDE, SODIUM LACTATE, POTASSIUM CHLORIDE, CALCIUM CHLORIDE: 600; 310; 30; 20 INJECTION, SOLUTION INTRAVENOUS at 07:54

## 2024-12-18 RX ADMIN — Medication 10 MG: at 08:59

## 2024-12-18 RX ADMIN — PROPOFOL 30 MG: 10 INJECTION, EMULSION INTRAVENOUS at 09:12

## 2024-12-18 NOTE — ANESTHESIA POSTPROCEDURE EVALUATION
Patient: Tangela Mac    Procedure: Procedure(s):  LAPAROSCOPIC BILATERAL SALPINGECTOMY       Anesthesia Type:  General    Note:  Disposition: Outpatient   Postop Pain Control: Uneventful            Sign Out: Well controlled pain   PONV: No   Neuro/Psych: Uneventful            Sign Out: Acceptable/Baseline neuro status   Airway/Respiratory: Uneventful            Sign Out: Acceptable/Baseline resp. status   CV/Hemodynamics: Uneventful            Sign Out: Acceptable CV status; No obvious hypovolemia; No obvious fluid overload   Other NRE: NONE   DID A NON-ROUTINE EVENT OCCUR? No           Last vitals:  Vitals Value Taken Time   BP 92/44 12/18/24 0945   Temp 96.8  F (36  C) 12/18/24 0945   Pulse 65 12/18/24 0956   Resp 16 12/18/24 0932   SpO2 94 % 12/18/24 0956   Vitals shown include unfiled device data.    Electronically Signed By: Edward Muñoz MD  December 18, 2024  9:59 AM

## 2024-12-18 NOTE — OP NOTE
Operative note    Preoperative diagnosis: Desires permanent sterilization  Postoperative diagnosis: Same    Procedure: Laparoscopic bilateral salpingectomy     Surgeon: Traci Jackson MD     Anesthesia: General  Complications: None  EBL: 5 ml  Specimens: Bilateral fallopian tubes    Findings: Normal appearing tubes, uterus, ovaries.    Indications: Patient is a 34 year old who desired permanent sterilization.  The risks, benefits and alternatives of surgical management were discussed in detail with the patient and she signed an informed consent on the morning of the procedure.    Procedure:  The patient was taken to the operating room where general endotracheal anesthesia was administered without difficulty.  She was then placed in the dorsal lithotomy position.  She was prepped and draped in the usual sterile fashion.  A trimble catheter was placed.  A bivalve speculum was placed in the patient's vagina and the anterior lip of the cervix was grasped with the single toothed tenaculum.  A sound for a uterine manipulator was then advanced into the uterus and rubber banded to a single toothed tenaculum.  The speculum was removed from the vagina.    Attention was then turned to the patient's abdomen where a 5 mm vertical skin incision was made in the umbilical fold.  The Veress needle was carefully introduced into the peritoneal cavity at a 90 degree angle while tenting the abdominal wall.  Intraperitoneal placement was confirmed by an intraabdominal pressure of 5 mm on insufflation with CO2 gas.  The 5 mm trocar and sleeve were then advanced without difficulty into the abdomen where intraabdominal placement was confirmed by the laparoscope.  No intraabdominal injury was noted from the placement of the first trocar.  After infiltration with 0.25% marcaine, a left lower quadrant 5 mm skin incision was made and trocar and sleeve advanced under direct visualization.  After infiltration with 0.25% marcaine, a 5 mm left mid  quadrant skin incision was made and trocar and sleeve advanced under direct visualization.    The patient was then placed in steep Trendelenberg position and a survey of the patient's pelvis revealed normal uterus, fallopian tubes, and ovaries.     The Ligasure was then advanced through a port and the patient's left fallopian tube was identified and followed out to the fimbriated end. The fallopian tube was sequentially coagulated and cut from the mesosalpinx to the level of the cornua where the fallopian tube was transected.  The left fallopian tube was removed through the 5 mm port and sent to pathology.  There was no bleeding in the mesosalpinx. Attention was turned to the right fallopian tube which was removed in a similar fashion and sent to pathology.     All instruments and ports were then removed from the patient's abdomen.  Incisions were repaired with 4-0 Vicryl and Dermabond.  The uterine manipulator was removed from the vagina with no bleeding noted from the cervix at the tenaculum site.  The patient tolerated the procedure well.  Sponge, lap, and needle counts were correct.  The patient was taken to the recovery room in stable condition.    Traci Jackson MD

## 2024-12-18 NOTE — INTERVAL H&P NOTE
"I have reviewed the surgical (or preoperative) H&P that is linked to this encounter, and examined the patient. There are no significant changes    Clinical Conditions Present on Arrival:  Clinically Significant Risk Factors Present on Admission                       # Severe Obesity: Estimated body mass index is 42.7 kg/m  as calculated from the following:    Height as of this encounter: 1.549 m (5' 1\").    Weight as of this encounter: 102.5 kg (226 lb).       "

## 2024-12-18 NOTE — ANESTHESIA PROCEDURE NOTES
Airway       Patient location during procedure: OR       Procedure Start/Stop Times: 12/18/2024 8:35 AM  Staff -        CRNA: Gris Reeder APRN CRNA       Performed By: CRNA  Consent for Airway        Urgency: elective  Indications and Patient Condition       Indications for airway management: arnold-procedural       Induction type:intravenous       Mask difficulty assessment: 1 - vent by mask    Final Airway Details       Final airway type: endotracheal airway       Successful airway: ETT - single and Oral  Endotracheal Airway Details        Cuffed: yes       Successful intubation technique: video laryngoscopy       VL Blade Size: Glidescope 3       Adjucts: stylet       Position: Right       Measured from: gums/teeth       Secured at (cm): 22       Bite block used: None    Post intubation assessment        Placement verified by: capnometry, equal breath sounds and chest rise        Number of attempts at approach: 1       Secured with: silk tape       Ease of procedure: easy       Dentition: Intact and Unchanged    Medication(s) Administered   Medication Administration Time: 12/18/2024 8:35 AM

## 2024-12-18 NOTE — ANESTHESIA PREPROCEDURE EVALUATION
Anesthesia Pre-Procedure Evaluation    Patient: Tangela Mac   MRN: [id number] : 1990        Procedure : Procedure(s):  LAPAROSCOPIC BILATERAL SALPINGECTOMY          Past Medical History:   Diagnosis Date    Depressive disorder     Uncomplicated asthma     seasonal asthma; inhaler used with overexertion      Past Surgical History:   Procedure Laterality Date    LAPAROSCOPIC CHOLECYSTECTOMY N/A 10/31/2024    Procedure: CHOLECYSTECTOMY, LAPAROSCOPIC;  Surgeon: Kellen Willard MD;  Location: [facility] OR      Allergies   Allergen Reactions    Bupropion Other (See Comments)     suicidality  suicidality  suicidality      Hydrocodone-Acetaminophen Nausea and Vomiting     Not allergic to acetaminophen    Hydrocodone-Acetaminophen Nausea and Vomiting    Buspirone Nausea     Nausea, but significant      Social History     Tobacco Use    Smoking status: Former    Smokeless tobacco: Never   Substance Use Topics    Alcohol use: No      Wt Readings from Last 1 Encounters:   24 102.5 kg (226 lb)        Anesthesia Evaluation   Pt has had prior anesthetic.     No history of anesthetic complications       ROS/MED HX  ENT/Pulmonary:  - neg pulmonary ROS   (+)                      asthma (seasonal, no recent issues)                  Neurologic:  - neg neurologic ROS     Cardiovascular:  - neg cardiovascular ROS     METS/Exercise Tolerance:     Hematologic:  - neg hematologic  ROS     Musculoskeletal:  - neg musculoskeletal ROS     GI/Hepatic:  - neg GI/hepatic ROS     Renal/Genitourinary:  - neg Renal ROS   (+)       Nephrolithiasis ,       Endo:  - neg endo ROS   (+)               Obesity (bmi 43),       Psychiatric/Substance Use:  - neg psychiatric ROS   (+) psychiatric history depression and anxiety       Infectious Disease:  - neg infectious disease ROS     Malignancy:  - neg malignancy ROS     Other:  - neg other ROS          Physical Exam    Airway        Mallampati: III   TM distance: > 3 FB   Neck  "ROM: full   Mouth opening: > 3 cm    Respiratory Devices and Support         Dental       (+) Minor Abnormalities - some fillings, tiny chips      Cardiovascular   cardiovascular exam normal          Pulmonary   pulmonary exam normal                OUTSIDE LABS:  CBC:   Lab Results   Component Value Date    WBC 7.6 10/31/2024    WBC 9.3 10/30/2024    HGB 10.3 (L) 10/31/2024    HGB 11.3 (L) 10/30/2024    HCT 33.5 (L) 10/31/2024    HCT 37.3 10/30/2024     10/31/2024     10/30/2024     BMP:   Lab Results   Component Value Date     10/31/2024     10/30/2024    POTASSIUM 3.6 10/31/2024    POTASSIUM 3.5 10/30/2024    CHLORIDE 106 10/31/2024    CHLORIDE 106 10/30/2024    CO2 24 10/31/2024    CO2 22 10/30/2024    BUN 5.5 (L) 10/31/2024    BUN 4.7 (L) 10/30/2024    CR 0.76 10/31/2024    CR 0.68 10/30/2024    GLC 94 10/31/2024    GLC 89 10/30/2024     COAGS: No results found for: \"PTT\", \"INR\", \"FIBR\"  POC:   Lab Results   Component Value Date    HCG Negative 12/18/2024    HCGS Negative 10/30/2024     HEPATIC:   Lab Results   Component Value Date    ALBUMIN 3.6 10/31/2024    PROTTOTAL 6.4 10/31/2024    ALT 54 (H) 10/31/2024    AST 33 10/31/2024    ALKPHOS 122 10/31/2024    BILITOTAL 0.7 10/31/2024     OTHER:   Lab Results   Component Value Date    LACT 1.3 09/27/2024    AMADEO 8.2 (L) 10/31/2024    LIPASE 14 10/30/2024       Anesthesia Plan    ASA Status:  3    NPO Status:  NPO Appropriate    Anesthesia Type: General.     - Airway: ETT   Induction: Propofol, Intravenous.   Maintenance: TIVA.   Techniques and Equipment:     - Airway: Video-Laryngoscope       Consents    Anesthesia Plan(s) and associated risks, benefits, and realistic alternatives discussed. Questions answered and patient/representative(s) expressed understanding.     - Discussed:     - Discussed with:  Patient            Postoperative Care    Pain management: Multi-modal analgesia.   PONV prophylaxis: Ondansetron (or other 5HT-3), " "Dexamethasone or Solumedrol     Comments:    Other Comments: Reviewed anesthetic options and risks, including risk of dental trauma. Patient agrees to proceed.                Edward Muñoz MD    I have reviewed the pertinent notes and labs in the chart from the past 30 days and (re)examined the patient.  Any updates or changes from those notes are reflected in this note.               # Hypertension: Noted on problem list           # Severe Obesity: Estimated body mass index is 42.7 kg/m  as calculated from the following:    Height as of this encounter: 1.549 m (5' 1\").    Weight as of this encounter: 102.5 kg (226 lb).       # Asthma: noted on problem list       "

## 2024-12-18 NOTE — DISCHARGE INSTRUCTIONS
If you have any questions or concerns regarding your procedure please contact Dr. Jackson, her office number is [phone].    You have received 975 mg of Acetaminophen (Tylenol) at 0735. Please do not take an additional dose of Tylenol until after 1:35pm.     Do not exceed 4,000 mg of acetaminophen during a 24 hour period and keep in mind that acetaminophen can also be found in many over-the-counter cold medications as well as narcotics that may be given for pain.     You received 5mg of Oxycodone at 10:15 AM. Please do not take an additional dose of Oxycodone until after 2:15 PM    Same-Day Surgery   Adult Discharge Orders & Instructions     For 24 hours after surgery    Get plenty of rest.  A responsible adult must stay with you for at least 24 hours after you leave the hospital.   Do not drive or use heavy equipment.  If you have weakness or tingling, don't drive or use heavy equipment until this feeling goes away.  Do not drink alcohol.  Avoid strenuous or risky activities.  Ask for help when climbing stairs.   You may feel lightheaded.  IF so, sit for a few minutes before standing.  Have someone help you get up.   If you have nausea (feel sick to your stomach): Drink only clear liquids such as apple juice, ginger ale, broth or 7-Up.  Rest may also help.  Be sure to drink enough fluids.  Move to a regular diet as you feel able.  You may have a slight fever. Call the doctor if your fever is over 100 F (37.7 C) (taken under the tongue) or lasts longer than 24 hours.  You may have a dry mouth, a sore throat, muscle aches or trouble sleeping.  These should go away after 24 hours.  Do not make important or legal decisions.   Call your doctor for any of the followin.  Signs of infection (fever, growing tenderness at the surgery site, a large amount of drainage or bleeding, severe pain, foul-smelling drainage, redness, swelling).    2. It has been over 8 to 10 hours since surgery and you are still not able  to urinate (pass water).    3.  Headache for over 24 hours.

## 2024-12-18 NOTE — ANESTHESIA CARE TRANSFER NOTE
Patient: Tangela Mac    Procedure: Procedure(s):  LAPAROSCOPIC BILATERAL SALPINGECTOMY       Diagnosis: Encounter for sterilization [Z30.2]  Diagnosis Additional Information: No value filed.    Anesthesia Type:   General     Note:    Oropharynx: oropharynx clear of all foreign objects and spontaneously breathing  Level of Consciousness: awake  Oxygen Supplementation: face mask  Level of Supplemental Oxygen (L/min / FiO2): 8  Independent Airway: airway patency satisfactory and stable  Dentition: dentition unchanged  Vital Signs Stable: post-procedure vital signs reviewed and stable  Report to RN Given: handoff report given  Patient transferred to: PACU    Handoff Report: Identifed the Patient, Identified the Reponsible Provider, Reviewed the pertinent medical history, Discussed the surgical course, Reviewed Intra-OP anesthesia mangement and issues during anesthesia, Set expectations for post-procedure period and Allowed opportunity for questions and acknowledgement of understanding    Vitals:  Vitals Value Taken Time   BP 87/52 12/18/24 0932   Temp 96.8  F (36  C) 12/18/24 0932   Pulse 63 12/18/24 0933   Resp 16 12/18/24 0932   SpO2 98 % 12/18/24 0933   Vitals shown include unfiled device data.    Electronically Signed By: MARY Aleman CRNA  December 18, 2024  9:36 AM

## (undated) DEVICE — GLOVE PI ULTRATCH M LF SZ 6.5 PF CUFF TEXT STRL LF 42665

## (undated) DEVICE — ESU GROUND PAD ADULT REM W/15' CORD E7507DB

## (undated) DEVICE — CUSTOM PACK LAP CHOLE SBA5BLCHEA

## (undated) DEVICE — SOL RINGERS LACTATED 1000ML BAG 2B2324X

## (undated) DEVICE — SU MONOCRYL+ 4-0 18IN PS2 UND MCP496G

## (undated) DEVICE — SUCTION STRYKERFLOW II 250-070-500

## (undated) DEVICE — CLIP APPLIER ENDO ROTATING 10MM MED/LG ER320

## (undated) DEVICE — GOWN LG DISP 9515

## (undated) DEVICE — DECANTER VIAL 2006S

## (undated) DEVICE — PREP CHLORAPREP 26ML TINTED HI-LITE ORANGE 930815

## (undated) DEVICE — TUBING SMOKE EVAC PNEUMOCLEAR HIGH FLOW 0620050250

## (undated) DEVICE — Device

## (undated) DEVICE — SUTURE PASSOR W/GUIDE RSG-14F-4-WG

## (undated) DEVICE — ENDO TROCAR FIRST ENTRY KII FIOS Z-THRD 05X100MM CTF03

## (undated) DEVICE — NDL INSUFFLATION 13GA 120MM C2201

## (undated) DEVICE — ENDO TROCAR SLEEVE KII Z-THREADED 05X100MM CTS02

## (undated) DEVICE — ENDO TROCAR FIRST ENTRY KII FIOS Z-THRD 11X100MM CTF33

## (undated) DEVICE — ENDO SHEARS RENEW LAP ENDOCUT SCISSOR TIP 16.5MM 3142

## (undated) DEVICE — SUCTION MANIFOLD NEPTUNE 2 SYS 1 PORT 702-025-000

## (undated) DEVICE — SOL WATER IRRIG 1000ML BOTTLE 2F7114

## (undated) RX ORDER — PROPOFOL 10 MG/ML
INJECTION, EMULSION INTRAVENOUS
Status: DISPENSED
Start: 2024-10-31

## (undated) RX ORDER — FENTANYL CITRATE 50 UG/ML
INJECTION, SOLUTION INTRAMUSCULAR; INTRAVENOUS
Status: DISPENSED
Start: 2024-10-31

## (undated) RX ORDER — CEFAZOLIN SODIUM 1 G/3ML
INJECTION, POWDER, FOR SOLUTION INTRAMUSCULAR; INTRAVENOUS
Status: DISPENSED
Start: 2024-10-31